# Patient Record
Sex: FEMALE | Race: WHITE | NOT HISPANIC OR LATINO | Employment: OTHER | URBAN - METROPOLITAN AREA
[De-identification: names, ages, dates, MRNs, and addresses within clinical notes are randomized per-mention and may not be internally consistent; named-entity substitution may affect disease eponyms.]

---

## 2024-02-24 PROBLEM — H35.372 EPIRETINAL MEMBRANE (ERM) OF LEFT EYE: Status: ACTIVE | Noted: 2019-04-05

## 2024-02-24 PROBLEM — H43.812 PVD (POSTERIOR VITREOUS DETACHMENT), LEFT EYE: Status: ACTIVE | Noted: 2019-06-17

## 2024-02-24 PROBLEM — H25.13 NUCLEAR SCLEROTIC CATARACT OF BOTH EYES: Status: ACTIVE | Noted: 2019-04-05

## 2024-02-24 PROBLEM — H53.10 SUBJECTIVE VISUAL DISTURBANCE: Status: ACTIVE | Noted: 2019-04-05

## 2024-02-24 PROBLEM — H43.393 VITREOUS FLOATER, BILATERAL: Status: ACTIVE | Noted: 2019-06-10

## 2024-02-24 PROBLEM — H52.7 REFRACTIVE ERROR: Status: ACTIVE | Noted: 2019-04-05

## 2024-02-26 ENCOUNTER — HOSPITAL ENCOUNTER (OUTPATIENT)
Dept: CARDIOLOGY | Facility: HOSPITAL | Age: 69
Discharge: HOME | End: 2024-02-26
Payer: MEDICARE

## 2024-02-26 ENCOUNTER — OFFICE VISIT (OUTPATIENT)
Dept: CARDIOLOGY | Facility: CLINIC | Age: 69
End: 2024-02-26
Payer: MEDICARE

## 2024-02-26 VITALS
BODY MASS INDEX: 28.16 KG/M2 | DIASTOLIC BLOOD PRESSURE: 93 MMHG | HEIGHT: 65 IN | WEIGHT: 169 LBS | SYSTOLIC BLOOD PRESSURE: 145 MMHG | HEART RATE: 96 BPM | OXYGEN SATURATION: 98 %

## 2024-02-26 DIAGNOSIS — K55.1 SUPERIOR MESENTERIC ARTERY STENOSIS (MULTI): ICD-10-CM

## 2024-02-26 DIAGNOSIS — R07.9 CHEST PAIN, UNSPECIFIED TYPE: Primary | ICD-10-CM

## 2024-02-26 PROCEDURE — 93010 ELECTROCARDIOGRAM REPORT: CPT | Performed by: INTERNAL MEDICINE

## 2024-02-26 PROCEDURE — 1159F MED LIST DOCD IN RCRD: CPT | Performed by: NURSE PRACTITIONER

## 2024-02-26 PROCEDURE — 99213 OFFICE O/P EST LOW 20 MIN: CPT | Performed by: NURSE PRACTITIONER

## 2024-02-26 PROCEDURE — 93005 ELECTROCARDIOGRAM TRACING: CPT

## 2024-02-26 PROCEDURE — 99203 OFFICE O/P NEW LOW 30 MIN: CPT | Performed by: NURSE PRACTITIONER

## 2024-02-26 RX ORDER — LEVOTHYROXINE SODIUM 25 UG/1
25 TABLET ORAL EVERY EVENING
COMMUNITY
Start: 2021-06-01

## 2024-02-26 RX ORDER — POLYETHYLENE GLYCOL 3350 17 G/17G
17 POWDER, FOR SOLUTION ORAL DAILY
COMMUNITY

## 2024-02-26 RX ORDER — LINACLOTIDE 145 UG/1
145 CAPSULE, GELATIN COATED ORAL
COMMUNITY
Start: 2019-01-28

## 2024-02-26 RX ORDER — CHOLECALCIFEROL (VITAMIN D3) 50 MCG
50 TABLET ORAL DAILY
COMMUNITY

## 2024-02-26 NOTE — PROGRESS NOTES
Bonnie Robledo is a 68 y.o. female     History Of Present Illness Mrs Ventura is a 68 year old female with a PMH of hypothyroidism and chronic headaches, here for concern of mesenteric artery stenosis noted on  an ultrasound, done for complaints of severe upper right abdominal pain since 2023.  Patient complains of constant RUQ abdominal pain.  She underwent removal of a seroma in  with no relief in pain.  In the fall of , she underwent a cholecystectomy with no relief in her pain.  The pain is constant and is not worse or better with food or water.  She has undergone multiple tests, including a colonoscopy, endoscopy, CT scans and ultrasounds.       PMHx:  Past Medical History:  Diagnosis Date  Chronic constipation  Disease of thyroid gland  hypothyroidism  Headache    PSHx:  Past Surgical History:  Procedure Laterality Date   SECTION, CLASSIC  CHOLECYSTECTOMY  COLONOSCOPY W/ BIOPSIES N/A 2023  Performed by Ben Lauren MD at [facility] ENDOSCOPY  EGD (HISTORICAL) 2013  TUBAL LIGATION  TUMOR EXCISION  WISDOM TOOTH EXTRACTION    PFMHx:  Family History  Problem Relation Name Age of Onset  Heart failure Mother 87  Heart failure Father 87  Suicide Brother 65    ALL: No Known Allergies      Review Of Systems   Constitutional: not feeling tired.   Eyes: no eyesight problems.  No vision loss or change in vision  ENT: no hearing loss and no nosebleeds.   Cardiovascular: No intermittent leg claudication,   No chest pain, no tightness or heavy pressure  No shortness of breath,  No palpitations,  No lower extremity edema  The heart rate is regular  Respiratory: no chronic cough and no shortness of breath.   Gastrointestinal: + RUG abdominal pain that is constant  no change in bowel habits and no blood in stools.   Genitourinary: no urinary frequency.   Skin: no skin rashes.   Neurological: No frequent falls.   No dizziness  No weakness  Denies headaches  Psychiatric: no depression and not  suicidal.   All other systems have been reviewed and are negative for complaint.        Allergies  No Known Allergies       Vitals  There were no vitals taken for this visit.        Physical Exam  Constitutional: alert and in no acute distress.   Eyes: no erythema, swelling or discharge from the eye .   Neck: neck is supple, symmetric, trachea midline, no masses  and no thyromegaly .   Pulmonary: No increased work of breathing or signs of respiratory distress    Lungs clear to auscultation.    Auscultation of the lungs revealed no expiratory wheezing, normal expiratory time and no inspiratory wheezing. no rales or crackles were heard bilaterally. no rhonchi  No friction rub. no wheezing.   No diminished breath sounds. no bronchial breath sounds.   Cardiovascular: carotid pulses 2+ bilaterally with no bruit    JVP was normal, no thrills ,   Regular rhythm, normal S1 and S2, no murmurs  the heart rate was normal normal S1, normal S2, no S3, no S4 no murmurs were heard.,   Pedal pulses 2+ bilaterally    No edema .   Abdomen: abdomen non-tender, no masses  and no hepatomegaly . No pulsatile mass noted  Skin: skin warm and dry, normal skin turgor .   Psychiatric judgment and insight is normal  and oriented to person, place and time .               Labs           EKG Findings  EKG:         Cardiac Service Results:  Upper gi series:  Persistent narrowing of the third portion of duodenum which can be seen with SMA syndrome. No obstruction is identified.     Remaining small bowel is normal in course and caliber. No tethering of the small bowel suggesting adhesions.         Assessment/Plan      Concern for SMA syndrome:  Unclear if her abdominal pain is related to SMA syndrome as she has constant pain that is not affected by what she eats or drinks.  The pain is located in the upper right abdominal area.  Will plan for a mesenteric duplex scan and have her follow up with me after testing to review the results.  She is to  follow up with her surgeon and GI as discussed.

## 2024-02-29 LAB
ATRIAL RATE: 76 BPM
P AXIS: 68 DEGREES
P OFFSET: 183 MS
P ONSET: 135 MS
PR INTERVAL: 166 MS
Q ONSET: 218 MS
QRS COUNT: 13 BEATS
QRS DURATION: 82 MS
QT INTERVAL: 358 MS
QTC CALCULATION(BAZETT): 402 MS
QTC FREDERICIA: 387 MS
R AXIS: 52 DEGREES
T AXIS: 53 DEGREES
T OFFSET: 397 MS
VENTRICULAR RATE: 76 BPM

## 2024-02-29 PROCEDURE — 93005 ELECTROCARDIOGRAM TRACING: CPT

## 2024-03-07 ENCOUNTER — OFFICE VISIT (OUTPATIENT)
Dept: CARDIOLOGY | Facility: CLINIC | Age: 69
End: 2024-03-07
Payer: MEDICARE

## 2024-03-07 ENCOUNTER — HOSPITAL ENCOUNTER (OUTPATIENT)
Dept: VASCULAR MEDICINE | Facility: CLINIC | Age: 69
Discharge: HOME | End: 2024-03-07
Payer: MEDICARE

## 2024-03-07 VITALS — OXYGEN SATURATION: 97 % | SYSTOLIC BLOOD PRESSURE: 149 MMHG | DIASTOLIC BLOOD PRESSURE: 84 MMHG | HEART RATE: 81 BPM

## 2024-03-07 DIAGNOSIS — K55.1 SUPERIOR MESENTERIC ARTERY STENOSIS (MULTI): ICD-10-CM

## 2024-03-07 DIAGNOSIS — K55.9 ACUTE VASCULAR DISORDER OF INTESTINE (MULTI): ICD-10-CM

## 2024-03-07 DIAGNOSIS — I77.4 MEDIAN ARCUATE LIGAMENT SYNDROME (CMS-HCC): Primary | ICD-10-CM

## 2024-03-07 PROCEDURE — 93975 VASCULAR STUDY: CPT

## 2024-03-07 PROCEDURE — 99213 OFFICE O/P EST LOW 20 MIN: CPT | Mod: 25 | Performed by: NURSE PRACTITIONER

## 2024-03-07 PROCEDURE — 99213 OFFICE O/P EST LOW 20 MIN: CPT | Performed by: NURSE PRACTITIONER

## 2024-03-07 PROCEDURE — 1159F MED LIST DOCD IN RCRD: CPT | Performed by: NURSE PRACTITIONER

## 2024-03-07 PROCEDURE — 93975 VASCULAR STUDY: CPT | Performed by: SURGERY

## 2024-03-07 NOTE — PROGRESS NOTES
Bonnie Robledo is a 68 y.o. female     History Of Present Illness    Mrs Ventura is a 68 year old female with a PMH of hypothyroidism and chronic headaches, here for a follow up for concern of mesenteric artery stenosis noted on  an ultrasound, done for complaints of severe upper right abdominal pain since 2023. The patient underwent a mesenteric duplex this am, which shows evidence of MALS.  Patient complains of constant RUQ abdominal pain.  She underwent removal of a seroma in  with no relief in pain.  In the fall of , she underwent a cholecystectomy with no relief in her pain.  The pain is constant and is not worse or better with food or water.  She has undergone multiple tests, including a colonoscopy, endoscopy, CT scans and ultrasounds.       PMHx:  Past Medical History:  Diagnosis Date  Chronic constipation  Disease of thyroid gland  hypothyroidism  Headache    PSHx:  Past Surgical History:  Procedure Laterality Date   SECTION, CLASSIC  CHOLECYSTECTOMY  COLONOSCOPY W/ BIOPSIES N/A 2023  Performed by Ben Lauren MD at [facility] ENDOSCOPY  EGD (HISTORICAL) 2013  TUBAL LIGATION  TUMOR EXCISION  WISDOM TOOTH EXTRACTION    PFMHx:  Family History  Problem Relation Name Age of Onset  Heart failure Mother 87  Heart failure Father 87  Suicide Brother 65    ALL: No Known Allergies     Social HX          Family HX  No family history on file.       Review Of Systems   Constitutional: not feeling tired.   Eyes: no eyesight problems.  No vision loss or change in vision  ENT: no hearing loss and no nosebleeds.   Cardiovascular: No intermittent leg claudication,   No chest pain, no tightness or heavy pressure  No shortness of breath,  No palpitations,  No lower extremity edema  The heart rate is regular  Respiratory: no chronic cough and no shortness of breath.   Gastrointestinal: + RUQ abdominal pain that is constant  no change in bowel habits and no blood in stools.   Genitourinary: no  urinary frequency.   Skin: no skin rashes.   Neurological: No frequent falls.   No dizziness  No weakness  Denies headaches  Psychiatric: no depression and not suicidal.   All other systems have been reviewed and are negative for complaint.           Allergies  Allergies   Allergen Reactions    Omeprazole Itching          Vitals  Visit Vitals  /84 (BP Location: Left arm, Patient Position: Sitting, BP Cuff Size: Adult)   Pulse 81 Comment: 81           Physical Exam       Cardiovascular: carotid pulses 2+ bilaterally with no bruit    JVP was normal, no thrills ,   Regular rhythm, normal S1 and S2, no murmurs  the heart rate was normal normal S1, normal S2, no S3, no S4 no murmurs were heard.,   Pedal pulses 2+ bilaterally    No edema .   Abdomen: abdomen non-tender, no masses  and no hepatomegaly . No pulsatile mass noted  Skin: skin warm and dry, normal skin turgor .   Current/Home Meds    Current Outpatient Medications:     cholecalciferol (Vitamin D3) 50 MCG (2000 UT) tablet, Take 1 tablet (50 mcg) by mouth once daily., Disp: , Rfl:     cyanocobalamin, vitamin B-12, (VITAMIN B-12 ORAL), Take by mouth., Disp: , Rfl:     Linzess 145 mcg capsule, 1 capsule (145 mcg)., Disp: , Rfl:     polyethylene glycol (Glycolax, Miralax) 17 gram packet, Take 17 g by mouth once daily., Disp: , Rfl:     Synthroid 25 mcg tablet, 1 tablet (25 mcg)., Disp: , Rfl:          Cardiac Service Results:    Mesenteric: SMA demonstrates no evidence of hemodynamically significant stenosis and 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 Velocities were decreased with inspiration and/or change to a sitting position which maybe suggesive of median arcuate ligament compression as opposed to intrinsic stenosis. The patient was NPO for this study. Turbulent flow noted in the distal celiac artery, hepatic, and splenic arteries.     Imaging & Doppler Findings:     Aorta PSV         100 cm/s  Celiac Origin   cm/s  Celiac Prox PSV   543 cm/s  Celiac Mid PSV    162 cm/s  Celiac Dist PSV   189 cm/s  SMA Origin PSV    174 cm/s  SMA Prox PSV      126 cm/s  SMA Mid PSV       104 cm/s  SMA Dist PSV      84 cm/s  SUNDAY PSV           110 cm/s  SUNDAY Prox PSV      110 cm/s  Hepatic PSV       250 cm/s  Splenic PSV       121 cm/s       Assessment/Plan      Median arcuate ligament syndrome (MALS) :  Mesenteric duplex scan done this am was positive for median arcuate ligament compression.  Case discussed with Dr Gary.  Will place a consult to Dr Farley (vascular surgery) for further evaluation and treatment.  She can follow up with me at the direction of Dr Farley.

## 2024-04-16 ENCOUNTER — OFFICE VISIT (OUTPATIENT)
Dept: VASCULAR SURGERY | Facility: CLINIC | Age: 69
End: 2024-04-16
Payer: MEDICARE

## 2024-04-16 VITALS
HEIGHT: 65 IN | RESPIRATION RATE: 18 BRPM | DIASTOLIC BLOOD PRESSURE: 94 MMHG | WEIGHT: 170 LBS | BODY MASS INDEX: 28.32 KG/M2 | SYSTOLIC BLOOD PRESSURE: 156 MMHG

## 2024-04-16 DIAGNOSIS — Z86.018 HISTORY OF BENIGN SCHWANNOMA: Primary | ICD-10-CM

## 2024-04-16 DIAGNOSIS — I77.4 MEDIAN ARCUATE LIGAMENT SYNDROME (CMS-HCC): ICD-10-CM

## 2024-04-16 PROCEDURE — 1036F TOBACCO NON-USER: CPT | Performed by: SURGERY

## 2024-04-16 PROCEDURE — 99215 OFFICE O/P EST HI 40 MIN: CPT | Performed by: SURGERY

## 2024-04-16 PROCEDURE — 1125F AMNT PAIN NOTED PAIN PRSNT: CPT | Performed by: SURGERY

## 2024-04-16 PROCEDURE — 1159F MED LIST DOCD IN RCRD: CPT | Performed by: SURGERY

## 2024-04-16 PROCEDURE — 99205 OFFICE O/P NEW HI 60 MIN: CPT | Performed by: SURGERY

## 2024-04-16 ASSESSMENT — ENCOUNTER SYMPTOMS
LOSS OF SENSATION IN FEET: 0
DEPRESSION: 0
OCCASIONAL FEELINGS OF UNSTEADINESS: 0

## 2024-04-16 ASSESSMENT — PATIENT HEALTH QUESTIONNAIRE - PHQ9
2. FEELING DOWN, DEPRESSED OR HOPELESS: NOT AT ALL
1. LITTLE INTEREST OR PLEASURE IN DOING THINGS: NOT AT ALL
SUM OF ALL RESPONSES TO PHQ9 QUESTIONS 1 AND 2: 0

## 2024-04-16 ASSESSMENT — LIFESTYLE VARIABLES
HOW MANY STANDARD DRINKS CONTAINING ALCOHOL DO YOU HAVE ON A TYPICAL DAY: 1 OR 2
SKIP TO QUESTIONS 9-10: 1
HOW OFTEN DO YOU HAVE A DRINK CONTAINING ALCOHOL: 2-3 TIMES A WEEK
AUDIT-C TOTAL SCORE: 3
HOW OFTEN DO YOU HAVE SIX OR MORE DRINKS ON ONE OCCASION: NEVER

## 2024-04-16 ASSESSMENT — VISUAL ACUITY: OU: 1

## 2024-04-16 ASSESSMENT — PAIN SCALES - GENERAL: PAINLEVEL: 3

## 2024-04-16 NOTE — PROGRESS NOTES
Vascular Surgery Consultation, History, Physical    Bonnie Robledo is a 68 y.o. year old female patient.   History:   Pain began about a year ago in right upperQuadrant near scar incision from prior retroperitoneal schwannoma resection.  Pain is often postprandial and associated with nausea and vomiting and patient suffered a 15 pound weight loss over the past year.  She has had an extensive GI workup including upper GI series and upper and lower endoscopies which have all been negative except for the upper GI series has been reported positive for SMA syndrome according to patient but general surgical consultation stated she did not have SMA syndrome.    She eats small meals.  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  Afterwards she developed same pain and she had her gallbladder removed without improvement.  She has always tolerated this discomfort until last year when she woke up with an episode of severe stabbing unrelenting pain.    Mesenteric duplex shows velocities of 596 cm/s the celiac axis suggesting severe compression by the median arcuate ligament.  She only has noncontrast CT from 3/16/2023.      Symptoms History  Pain Onset: 3/16/2023  Pain Location: RUQ  Pain Triggers: Eating, spontaneous  Digestive symptoms: nausea, early satiety, vomiting  Genito-Urologic symptoms: none  Weight Change (over time period): 15 pounds over year    Imaging Studies  Mesenteric Duplex: Severe compression celiac axis by median arcuate ligament with velocities over 500cm/s resolving with deep inspiration  CT/A: ordered  Contrast Angiography: none  UGIS: reportedly positive for SMAS  [facility]: [facility]  [facility]: negative -[facility] report requested    Surgical History  Cholecystectomy: 2013 with similar pain profile after resection of retroperitoneal Schwannoma  Feeding tube: none  Central lines:  none  Other Surgery: 2013 -resection of retroperitoneal Schwannoma by Monica at [facility]        History reviewed. No pertinent past medical history.    History reviewed. No pertinent surgical history.    Active Ambulatory Problems     Diagnosis Date Noted    Epiretinal membrane (ERM) of left eye 04/05/2019    Nuclear sclerotic cataract of both eyes 04/05/2019    PVD (posterior vitreous detachment), left eye 06/17/2019    Refractive error 04/05/2019    Subjective visual disturbance 04/05/2019    Vitreous floater, bilateral 06/10/2019     Resolved Ambulatory Problems     Diagnosis Date Noted    No Resolved Ambulatory Problems     No Additional Past Medical History       Review of Systems   Constitutional: Negative.    HENT: Negative.     Eyes: Negative.    Respiratory: Negative.     Cardiovascular: Negative.    Gastrointestinal: Negative.    Endocrine: Negative.    Genitourinary: Negative.    Musculoskeletal: Negative.    Skin: Negative.    Allergic/Immunologic: Negative.    Neurological: Negative.    Hematological: Negative.    Psychiatric/Behavioral: Negative.       Allergies   Allergen Reactions    Omeprazole Itching       Vitals:   Visit Vitals  BP (!) 156/94 Comment: patient states that she is nervous, advised to follow up PCP   Resp 18     Physical Exam:   Vascular Physical Exam  Constitutional:       General: She is awake.   HENT:      Head: Normocephalic.   Eyes:      General: Vision grossly intact.      Pupils: Pupils are equal, round, and reactive to light.   Pulmonary:      Effort: Pulmonary effort is normal.   Abdominal:      General: Abdomen is protuberant. Surgical scar is present.      Tenderness: There is abdominal tenderness.      Comments: Pain medial to cephalad margin of scar   Musculoskeletal:         General: Normal range of motion.   Skin:     General: Skin is warm.   Neurological:      General: No focal deficit present.      Mental Status: She is alert.   Psychiatric:         Mood and Affect:  "Mood normal.         Labs:  LABS:  CBC with Differential:  No results found for: \"WBC\", \"RBC\", \"HGB\", \"HCT\", \"PLT\", \"MCV\", \"MCH\", \"MCHC\", \"RDW\", \"NRBC\", \"SEGSPCT\", \"BANDSPCT\", \"BLASTSPCT\", \"METASPCT\", \"LYMPHOPCT\", \"PROMYELOPCT\", \"MONOPCT\", \"MYELOPCT\", \"EOSPCT\", \"BASOPCT\", \"MONOSABS\", \"LYMPHSABS\", \"EOSABS\", \"BASOSABS\", \"DIFFTYPE\"  CMP:  No results found for: \"NA\", \"K\", \"CL\", \"CO2\", \"BUN\", \"CREATININE\", \"AGRATIO\", \"GLUCOSE\", \"GLU\", \"PROT\", \"CALCIUM\", \"BILITOT\", \"ALKPHOS\", \"AST\", \"ALT\"  BMP:  No results found for: \"NA\", \"K\", \"CL\", \"CO2\", \"BUN\", \"CREATININE\", \"CALCIUM\", \"GLUCOSE\", \"GLU\"  Magnesium:No results found for: \"MG\"  Troponin:  No results found for: \"TROPHS\"  BNP: No results found for: \"BNP\"    No results found for: \"PR1\", \"BMPR1A\", \"INR\", \"PROTIME\", \"PTT\", \"CHOL\", \"LDLF\", \"HDL\"    Imaging: CTA ordered      Assessment/Plan   Diagnoses and all orders for this visit:  History of benign schwannoma  -     CT angio abdomen pelvis w and or wo IV IV contrast; Future  -     Creatinine, Serum; Future  -     Referral to Pain Medicine; Future  -     Referral to General Surgery; Future  Median arcuate ligament syndrome (CMS-HCC)  -     Referral to Vascular Surgery  -     CT angio abdomen pelvis w and or wo IV IV contrast; Future  -     Creatinine, Serum; Future  -     Referral to Pain Medicine; Future  -     Referral to General Surgery; Future    Will get CTA -check for schwannoma and arterial anatomy, pain medicine for celiac plexus block, gen surg for GI workup.    "

## 2024-04-18 ENCOUNTER — TELEPHONE (OUTPATIENT)
Dept: SURGERY | Facility: CLINIC | Age: 69
End: 2024-04-18
Payer: MEDICARE

## 2024-04-18 NOTE — TELEPHONE ENCOUNTER
Thank you for speaking with me earlier today & confirming your scheduled visit with Dr. Kumar on 5/13/24 12PM at [facility] [id number] [place] [address] 440[address][id number] (inside [facility], main floor in the Specialty Clinic).   Parking is available at a cost of $5.00 at the Main Entrance.   We look forward to seeing you, Nancy Alvarez LPN Clinic Nurse.

## 2024-04-24 ENCOUNTER — APPOINTMENT (OUTPATIENT)
Dept: SURGERY | Facility: CLINIC | Age: 69
End: 2024-04-24
Payer: MEDICARE

## 2024-05-06 ENCOUNTER — HOSPITAL ENCOUNTER (OUTPATIENT)
Dept: RADIOLOGY | Facility: CLINIC | Age: 69
Discharge: HOME | End: 2024-05-06
Payer: MEDICARE

## 2024-05-06 DIAGNOSIS — Z86.018 HISTORY OF BENIGN SCHWANNOMA: ICD-10-CM

## 2024-05-06 DIAGNOSIS — I77.4 MEDIAN ARCUATE LIGAMENT SYNDROME (CMS-HCC): ICD-10-CM

## 2024-05-06 PROCEDURE — 2550000001 HC RX 255 CONTRASTS: Performed by: SURGERY

## 2024-05-06 PROCEDURE — 74174 CTA ABD&PLVS W/CONTRAST: CPT | Performed by: RADIOLOGY

## 2024-05-06 PROCEDURE — 74174 CTA ABD&PLVS W/CONTRAST: CPT

## 2024-05-06 RX ADMIN — IOHEXOL 75 ML: 350 INJECTION, SOLUTION INTRAVENOUS at 11:15

## 2024-05-07 ENCOUNTER — TELEPHONE (OUTPATIENT)
Dept: SURGERY | Facility: CLINIC | Age: 69
End: 2024-05-07
Payer: MEDICARE

## 2024-05-07 NOTE — TELEPHONE ENCOUNTER
Left message reminding patient of upcoming appointment date/time and nurse contact number given for any further questions/concerns

## 2024-05-07 NOTE — PROGRESS NOTES
GENERAL SURGERY REFLUX SURGERY NEW PATIENT CONSULTATION  HISTORY AND PHYSICAL    CLINIC DATE:  5/13/24    NAME: Bonnie Robledo  68 y.o.  MRN: [id number]    WEIGHT / BMI TODAY:  171 LBS/ 28.46  Wt Readings from Last 1 Encounters:   05/13/24 77.6 kg (171 lb)      BMI Readings from Last 1 Encounters:   05/13/24 28.46 kg/m²       SURGEON:  Dr. Nicolle Kumar    PRESENTING TODAY for General Surgery initial consult visit:  Is a 68 y.o. female reporting Initial onset of symptoms was:  Since 3/2023.  Self verbalizes concerns:  MALS Referral from Dr. SAE Farley (last note attached below)    Recently completed CT ABD/Pelvis & Mesenteric Artery Duplex test results attached below.  2013: removal of RP schwannoma. She was told it was by kidney retroperitoneal. Not related to adrenal. Not sure of size. No recurrence seen on CT. Pain in front now not back. Always in same spot on abd wall. It was retroperitoneal. She has a large Kocher incision.  Her presenting symptoms at that time was abdominal pain and vomiting. She still had discomfort since that time in the right hemiabdomen. Pain was better. For that, she had a cholecystectomy in 2013. She continued to have the discomfort every month or so. Last year, in March, she woke up in the middle of the night, she woke up with severe abdominal pain in the same spot - right hemiabdomen. It lasted for hours then the pain subsided that day. She called her doctor who ordered a scan.  She denies bloating.  She notes only vomits if has pain already and eats certain things.  Only vomits maybe once/week but no obvious trigger.  Moves bowels daily on linzess and miralax.  She notes the pain is constant on her side.  The incision is more vertial than a kocher.  On ct clips seen next to IVC from prior resection.  No back pain.     Since then has pain in same spot.  The symtpms worsened in March last year that awaked her at night.  Lasted many hours and then doctor started more work up.  All done in  [place] at [facility].    Ultrasound normal  Colonoscopy normal at [facility] normal  Upper endoscopy normal  CT angio show mals.  Symptoms improve with daily linzess and miralax..  Was constipated and never worked up and moves bowels every 3-4 days.  Pain depends on food.  Worse if bends over.  Soft food easier.  Vomits a couple otimes/week.  No GES done  She saw GI and surgeons - eventually diagnosed her with SMA syndrome. The surgeon in [place] told her she didn't have SMA syndrome. PCP tried to refer her to ml but they didn't want to see her. She finally was referred to Dr. Bernard in [place] and her PA ordered an US that showed MALS who then referred her to Dr. Farley.   The pain is always there but depends on if she is taking linzess and miralax as well as what she eats. Because of the linzess and miralax, she has bowel movements daily. She used to have constipation since years that has not been worked up by GI. She would not have a BM for 3-4 days. She is always drinking. She does eat fruits/vegetables - maybe 3-4 servings a day.  The pain is worse when she bends.   She did lose weight - unintentional - 195 to 175lbs.   Sometimes after eating, she does have severe pain and she vomits. It happens a few times a week. She wouldn't vomit if she didn't eat anything.   No heartburn or reflux. No regurge. No chest tightness or pain. She did have EGD and colonoscopy last year at [place] in [facility] - negative.     CURRENT SYMPTOMS:-    Abdominal pain:  Yes          Abdominal bloating:  No         Burping:  No          Chest Pain:  No      Chronic Cough: No         Constipation: Yes    Diarrhea: No    Dysphagia:  No       Experiencing hunger: No    Food Intolerances: No    Food Sticking:  No    Gassy:  No    Hiccups: No          Hoarseness:  No       Hx Gastric Ulcers:  No         Nausea/vomiting: Yes    Symptoms Affect Ability to Excercise:  Yes         Throat Clearing:  No             DIET HISTORY:      Carbonated Beverages: Yes           Caffeinated Beverages: Yes    Fluid intake: feels stays hydrated oz/day        GERD - Health Related Quality of Life Questionnaire (GERD- HRQL)  Off PPI for allergy to Omeprazole (very itching)  last August (how long)    How bad is the heartburn? 0 = No symptoms  Heartburn when lying down? 0 = No symptoms  Heartburn when standing up? 0 = No symptoms  Heartburn after meals? 0 = No symptoms  Does heartburn change your diet? 0 = No symptoms  Does heartburn wake you from sleep? 0 = No symptoms    Do you have difficulty swallowing? 0 = No symptoms  Do you have pain with swallowing? 0 = No symptoms  If you take medication, does this affect your daily life? 0 = No symptoms    How bad is the regurgitation? 0 = No symptoms  Regurgitation when lying down? 0 = No symptoms  Regurgitation when standing up? 0 = No symptoms  Regurgitation after meals? 0 = No symptoms  Does regurgitation change your diet? 0 = No symptoms  Does regurgitation wake you from sleep? 0 = No symptoms  How satisfied are you with your present condition? Satisfied    Total score (calculated by summing the individual scores of questions 1-15): 0   Greatest possible score 75 (worst symptoms).   Lowest possible score 0 (no symptoms).    Heartburn score (calculated by summing the individual scores of questions 1-6): 0   Worst heartburn symptoms: 30.   No heartburn symptoms: 0.   Score less than or equal to 12 with each individual question not exceeding 2 indicate heartburn elimination.    Regurgitation score (calculated by summing the individual scores of questions 10-15): 0   Worst regurgitation symptoms: 30.   No regurgitation symptoms: 0.   Score less than or equal to 12 with each individual question not exceeding 2 indicate regurgitation elimination.     DR FARLEY 4/16/24 NOTE:    Office Visit  4/16/2024  [facility] Physician Ambrose Farley MD  Vascular Surgery History of benign schwannoma +1 more  Dx New Patient Visit ; Referred by Courtney  CEZAR Dewitt-CNP  Reason for Visit     Progress Notes  Cl Farley MD (Physician)  Vascular Surgery  Expand All Collapse All  Vascular Surgery Consultation, History, Physical     Bonnie Robledo is a 68 y.o. year old female patient.   History:   Pain began about a year ago in right upperQuadrant near scar incision from prior retroperitoneal schwannoma resection.  Pain is often postprandial and associated with nausea and vomiting and patient suffered a 15 pound weight loss over the past year.  She has had an extensive GI workup including upper GI series and upper and lower endoscopies which have all been negative except for the upper GI series has been reported positive for SMA syndrome according to patient but general surgical consultation stated she did not have SMA syndrome.     She eats small meals.  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  Afterwards she developed same pain and she had her gallbladder removed without improvement.  She has always tolerated this discomfort until last year when she woke up with an episode of severe stabbing unrelenting pain.     Mesenteric duplex shows velocities of 596 cm/s the celiac axis suggesting severe compression by the median arcuate ligament.  She only has noncontrast CT from 3/16/2023.        Symptoms History  Pain Onset: 3/16/2023  Pain Location: RUQ  Pain Triggers: Eating, spontaneous  Digestive symptoms: nausea, early satiety, vomiting  Genito-Urologic symptoms: none  Weight Change (over time period): 15 pounds over year     Imaging Studies  Mesenteric Duplex: Severe compression celiac axis by median arcuate ligament with velocities over 500cm/s resolving with deep inspiration  CT/A: ordered  Contrast Angiography: none  UGIS: reportedly positive for SMAS  EGD: negative -done at [facility] report requested  Colonoscopy: negative -done at [facility] report requested     Surgical  History  Cholecystectomy: 2013 with similar pain profile after resection of retroperitoneal Schwannoma  Feeding tube: none  Central lines: none  Other Surgery: 2013 -resection of retroperitoneal Schwannoma by [facility] at [facility]           Medical History   History reviewed. No pertinent past medical history.        Surgical History   History reviewed. No pertinent surgical history.             Active Ambulatory Problems     Diagnosis Date Noted    Epiretinal membrane (ERM) of left eye 04/05/2019    Nuclear sclerotic cataract of both eyes 04/05/2019    PVD (posterior vitreous detachment), left eye 06/17/2019    Refractive error 04/05/2019    Subjective visual disturbance 04/05/2019    Vitreous floater, bilateral 06/10/2019           Resolved Ambulatory Problems     Diagnosis Date Noted    No Resolved Ambulatory Problems      No Additional Past Medical History         Review of Systems   Constitutional: Negative.    HENT: Negative.     Eyes: Negative.    Respiratory: Negative.     Cardiovascular: Negative.    Gastrointestinal: Negative.    Endocrine: Negative.    Genitourinary: Negative.    Musculoskeletal: Negative.    Skin: Negative.    Allergic/Immunologic: Negative.    Neurological: Negative.    Hematological: Negative.    Psychiatric/Behavioral: Negative.             Allergies   Allergen Reactions    Omeprazole Itching         Vitals:   Visit Vitals  BP (!) 156/94 Comment: patient states that she is nervous, advised to follow up PCP   Resp 18      Physical Exam:   Vascular Physical Exam  Constitutional:       General: She is awake.   HENT:      Head: Normocephalic.   Eyes:      General: Vision grossly intact.      Pupils: Pupils are equal, round, and reactive to light.   Pulmonary:      Effort: Pulmonary effort is normal.   Abdominal:      General: Abdomen is protuberant. Surgical scar is present.      Tenderness: There is abdominal tenderness.      Comments: Pain medial to cephalad margin of scar  "  Musculoskeletal:         General: Normal range of motion.   Skin:     General: Skin is warm.   Neurological:      General: No focal deficit present.      Mental Status: She is alert.   Psychiatric:         Mood and Affect: Mood normal.            Labs:  LABS:  CBC with Differential:  No results found for: \"WBC\", \"RBC\", \"HGB\", \"HCT\", \"PLT\", \"MCV\", \"MCH\", \"MCHC\", \"RDW\", \"NRBC\", \"SEGSPCT\", \"BANDSPCT\", \"BLASTSPCT\", \"METASPCT\", \"LYMPHOPCT\", \"PROMYELOPCT\", \"MONOPCT\", \"MYELOPCT\", \"EOSPCT\", \"BASOPCT\", \"MONOSABS\", \"LYMPHSABS\", \"EOSABS\", \"BASOSABS\", \"DIFFTYPE\"  CMP:  No results found for: \"NA\", \"K\", \"CL\", \"CO2\", \"BUN\", \"CREATININE\", \"AGRATIO\", \"GLUCOSE\", \"GLU\", \"PROT\", \"CALCIUM\", \"BILITOT\", \"ALKPHOS\", \"AST\", \"ALT\"  BMP:  No results found for: \"NA\", \"K\", \"CL\", \"CO2\", \"BUN\", \"CREATININE\", \"CALCIUM\", \"GLUCOSE\", \"GLU\"  Magnesium:No results found for: \"MG\"  Troponin:  No results found for: \"TROPHS\"  BNP: No results found for: \"BNP\"     No results found for: \"PR1\", \"BMPR1A\", \"INR\", \"PROTIME\", \"PTT\", \"CHOL\", \"LDLF\", \"HDL\"     Imaging: CTA ordered        Assessment/Plan   Diagnoses and all orders for this visit:  History of benign schwannoma  -     CT angio abdomen pelvis w and or wo IV IV contrast; Future  -     Creatinine, Serum; Future  -     Referral to Pain Medicine; Future  -     Referral to General Surgery; Future  Median arcuate ligament syndrome (CMS-HCC)  -     Referral to Vascular Surgery  -     CT angio abdomen pelvis w and or wo IV IV contrast; Future  -     Creatinine, Serum; Future  -     Referral to Pain Medicine; Future  -     Referral to General Surgery; Future     Will get CTA -check for schwannoma and arterial anatomy, pain medicine for celiac plexus block, gen surg for GI workup.         Electronically signed by Cl Farley MD at 4/16/2024  2:46 PM     Instructions    AVS - Outpatient (Printed 4/16/2024)  Additional Documentation    Vitals: /94 Important       Resp 18     Ht 1.651 m (5' 5\")     Wt 77.1 " kg (170 lb)     BMI 28.29 kg/m²     BSA 1.88 m²     Pain Sc   3 (Loc: Abdomen) (Edu: Yes)          More Vitals   Flowsheets: Interfaced Flowsheet Data,     Opioid Risk Tool (FEMALE),     Audit Alcohol Screening,     Alcohol Use Disorders Identification Test,     Patient Health Questionnaire-2/9,     Spiritual and Cultural Beliefs,     Domestic Violence Screen (Annually/Change in Status) Outpatient Hospital ([place]) Required,     Key Learner   SmartForms: BHAVANA RODRIGUEZ FALL RISK   Encounter Info: Billing Info,     History,     Allergies,     Developmental     Communications    View All Conversations on this Encounter    Chart Routed to Nicolle Kumar MD MPH and Agapito Parker MD  Travel Screening    Question 4/16/2024  1:14 PM EDT - Filed by Patient   Do you have any of the following new or worsening symptoms? None of these   Have you recently been in contact with someone who was sick? No / Unsure       COMPLETED TEST RESULTS:    CT angio abdomen pelvis w and or wo IV IV contrast  Status: Final result     PACS Images     Show images for CT angio abdomen pelvis w and or wo IV IV contrast  Signed by    Signed Time Phone Pager   Juan Winters MD 5/09/2024 06:02 [phone]     Exam Information    Status Exam Begun Exam Ended   Final 5/06/2024 10:58 5/06/2024 13:50     Study Result    Narrative & Impression   Interpreted By:  Juan Winters,  and Master [address]   STUDY:  CT ANGIO ABDOMEN PELVIS W AND/OR WO IV IV CONTRAST;  5/6/2024 1:50 pm      INDICATION:  Signs/Symptoms:right upper quadrant pain, celiac axis compression,  prior schwannoma resection. 68-year-old female with a prior surgical  resection of the schwannoma. 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 CTA was performed for further  evaluation      COMPARISON:  Vascular mesenteric duplex: 03/07/2024.      ACCESSION NUMBER(S):  [id number]      ORDERING  CLINICIAN:  ILANA DELANEY      TECHNIQUE:  Axial non-contrast images of the abdomen, and pelvis with coronal and  sagittal reformatted images. Axial CT images of the abdomen and  pelvis after the intravenous administration of 75 mL of Omnipaque 350  using angiographic technique with coronal and sagittal reformatted  images. MIP images were provided and reviewed. 3D reconstructions  were created on a separate independent workstation and reviewed.      FINDINGS:  VASCULATURE:      ABDOMINAL AORTA: No abdominal aortic aneurysm or dissection. Mild  abdominal aortic atherosclerosis.      ABDOMINAL AND PELVIC ARTERIES:      Celiac artery is patent at the origin. Proximal celiac artery just  distal to the origin of the straits a significant focal area of  stenosis better visualized on sagittal imaging series 503, image 105.  Distal to the focal area of stenosis there is mild post stenotic  dilatation with otherwise normal opacification of the main branching  vessels of the celiac trunk. Superior mesenteric artery is widely  patent without hemodynamically significant stenosis.      Right and left renal arteries are widely patent without  hemodynamically significant stenosis.      Inferior mesenteric artery is widely patent without hemodynamically  significant stenosis.      Bilateral common iliac arteries, external iliac and internal iliac  arteries are widely patent without hemodynamically significant  stenosis.      Bilateral common femoral arteries and partially visualized proximal  SFA and profunda arteries are widely patent without hemodynamically  significant stenosis.      NONVASCULAR STRUCTURES:      LOWER CHEST:  The visualized lung base is unremarkable. The heart is normal in size  without pericardial effusion. No pleural effusion is present.  Visualized distal esophagus appears normal.      ABDOMEN:      LIVER:  The liver is normal in size without evidence of focal liver lesions.      BILE DUCTS:  The intrahepatic  and extrahepatic ducts are not dilated.      GALLBLADDER:  The gallbladder is decompressed.      PANCREAS:  The pancreas appears unremarkable without evidence of ductal  dilatation or masses.      SPLEEN:  The spleen is normal in size.      ADRENAL GLANDS:  Bilateral adrenal glands appear normal.      KIDNEYS AND URETERS:  The kidneys are normal in size and enhance symmetrically. Trace  fullness of the bilateral pelvicalyceal systems without evidence of  hydroureteronephrosis.      PELVIS:      BLADDER:  Within normal limits.      REPRODUCTIVE ORGANS:  No pelvic masses.      BOWEL:  The stomach is unremarkable. The small and large bowel are normal in  caliber and demonstrate no wall thickening. Normal appendix.          PERITONEUM/RETROPERITONEUM/LYMPH NODES:  Surgical clips are seen posterior to the supra and infrarenal IVC  along the right anterior vertebral bodies likely representing area of  prior surgical resection. No soft tissue lesions within the area to  suggest recurrence. Is no free or loculated fluid collection, no free  intraperitoneal air. The retroperitoneum appears normal. No enlarged  mesenteric lymph nodes.      BONES AND ABDOMINAL WALL:  No suspicious osseous lesions are identified. Mild degenerative  discogenic disease is noted in the lower thoracic and lumbar spine.  The abdominal wall soft tissues appear normal.      IMPRESSION:  1. Significant focal narrowing of the proximal celiac trunk just  distal to the origin of the suspected level of the median arcuate  ligament. Distal to the focal area of stenosis there is mild  poststenotic dilatation with otherwise normal opacification of the  vasculature. In the appropriate clinical setting findings can be seen  with median arcuate ligament syndrome.  2. The remaining vasculature demonstrates no significant abnormality.  3. No acute nonvascular abnormalities of the abdomen and pelvis.  Chronic findings are as detailed above.          I personally  "reviewed the images/study and I agree with the findings  as stated by Resident Maciel Thao MD. This study was interpreted  at [facility], [place], [place].      MACRO:  None.      Signed by: Juan Winters 5/9/2024 6:02 AM  Dictation workstation:   [id number]     Result History    CT angio abdomen pelvis w and or wo IV IV contrast (Order #[id number]) on 5/9/2024 - Order Result History Report    Breast Imaging Recommendations  Bonnie Robledo  No recommendations exist for this order.  External Results Report    Open External Results Report    Encounter    View Encounter             IR Documentation Timeline (5/9/2024 13:35:58 to 5/9/2024 13:35:58)    5/6/2024 5/6/2024 Event Details User   10:54:53 In Facility Status: Arrived --           Screening Form Questions    No questions have been answered for this form.           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  [address], [address], [place] [address]                   Tel [phone]        Vascular Lab Report  VASC US MESENTERIC ARTERY DUPLEX COMPLETE        Patient Name:     BONNIE ROBLEDO       Reading Physician: [id number] Cecilio Reina DO  Study Date:  "      3/7/2024            Ordering           [id number] ARMEN DEWITT                                        Physician:  MRN/PID:          [id number]            Technologist:      Enid Andrade RVT,                                                           [facility]  Accession#:       [id number]        Technologist 2:  Date of           1955 / 68      Encounter#:        [id number]  Birth/Age:        years  Gender:           F  Admission Status: Outpatient          Location           [facility]                                        Performed:        Diagnosis/ICD: Acute mesenteric ischemia-K55.0  CPT Codes:     20527 Mesenteric Duplex scan        **CRITICAL RESULT**  Critical Result: Positive for median arcuate ligament compression.  Notification called to Dr Gary/Sheryl Dewitt CNP on 3/7/2024 at 8:54:28 AM by Enid Andrade RVT.     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 Velocities were decreased with inspiration and/or change to a sitting position which maybe suggesive of median arcuate ligament compression as opposed to intrinsic stenosis. The patient was NPO for this study. Turbulent flow noted in the distal celiac artery, hepatic, and splenic arteries.     Imaging & Doppler Findings:     Aorta PSV         100 cm/s  Celiac Origin  cm/s  Celiac Prox PSV   543 cm/s  Celiac Mid PSV    162 cm/s  Celiac Dist PSV   189 cm/s  SMA Origin PSV    174 cm/s  SMA Prox PSV      126 cm/s  SMA Mid PSV       104 cm/s  SMA Dist PSV      84 cm/s  SUNDAY PSV           110 cm/s  SUNDAY Prox PSV      110 cm/s  Hepatic PSV       250 cm/s  Splenic PSV       121 cm/s           [id number] Cecilio Reina DO  Electronically signed by [id number] Cceilio Reina DO on 3/7/2024 at 2:39:23 PM           ** Final **     Exam Details    Performed Procedure Technologist Supporting Staff Performing Physician    Vascular US Mesenteric Artery Duplex Complete Enid Andrade, RT           Appointment Date/Status Modality Department    3/7/2024     Completed PAR FAIRLW VASCULAR PAR [id number] VASCULAR           Begin Exam End Exam  End Exam Questionnaires   3/7/2024  8:00 AM 3/7/2024  9:00 AM  OTIS RIS PACS IMAGES REVIEW QUESTIONNAIRE            Scans on Order [id number]    [facility] - Scan on 3/7/2024  9:04 AM        [facility] - Scan on 3/7/2024  2:41 PM        All Reviewers List    Cl Farley MD on 4/22/2024 15:36     Signed    Electronically signed by Cecilio Reina DO on 3/7/24 at 1439 EST       Mesenteric artery duplex complete: Patient Communication     Add Comments   Add Notifications      Encounter    View Encounter            External Results Report    Open External Results Report     Order Report     Order Details          Encounter-Level Documents on 03/07/2024:    Consent-General - Electronic signature on 3/7/2024 8:03 AM - E-signed         Order-Level Documents on 03/07/2024:    [facility] - Scan on 3/7/2024  2:41 PM  [facility] - Scan on 3/7/2024  9:04 AM         Patient-Level Documents:    Referral Attachment - Document on 4/19/2024 11:05 AM: [email or web address]  Patient Records - Scan on 4/17/2024 10:37 AM: [facility]-Upper Gi Endoscopy report 4/20/2024  Parent Guardian Consent to Treat Minor Patients - Scan on 4/17/2024 10:35 AM: EGD-Colonoscopy report 2/12/2013  Patient Records - Scan on 4/17/2024 10:28 AM: [facility] - Final G.I. Pathology report  Patient Records - Scan on 4/17/2024 10:24 AM: [facility]-Colonoscopy results  Consent-General Physician - Electronic signature on 4/16/2024 1:10 PM - E-signed  HIPAA for E-Sig - Electronic signature on 4/16/2024 1:09 PM - E-signed  Referral Attachment - Document on 2/27/2024 1:19 PM: [email or web address]  Insurance Card - Scan on 2/26/2024 3:01 PM: Insurance Card  Photo ID - Scan on 2/26/2024 3:01 PM                 CURRENT MEDICATIONS:     Current Outpatient Medications   Medication Sig Dispense Refill    cholecalciferol (Vitamin D3) 50 MCG (2000 UT) tablet Take 1 tablet (50 mcg) by mouth once daily.      cyanocobalamin, vitamin B-12, (VITAMIN B-12 ORAL) Take by mouth.      Linzess 145 mcg capsule 1 capsule (145 mcg).      losartan (Cozaar) 25 mg tablet Take 1 tablet (25 mg) by mouth once daily.      polyethylene glycol (Glycolax, Miralax) 17 gram packet Take 17 g by mouth once daily.      Synthroid 25 mcg tablet 1 tablet (25 mcg).       No current facility-administered medications for this visit.       PAST MEDICAL HISTORY:  Patient Active Problem List   Diagnosis    Epiretinal membrane (ERM) of left eye    Nuclear sclerotic cataract of both eyes    PVD (posterior vitreous detachment), left eye    Refractive error    Subjective visual disturbance    Vitreous floater, bilateral       PAST SURGICAL HISTORY:  Past Surgical History:   Procedure Laterality Date     SECTION, CLASSIC      CHOLECYSTECTOMY         FAMILY HISTORY:  Family History   Problem Relation Name Age of Onset    Hypertension Mother      Heart attack Mother         SOCIAL HISTORY:  Social History     Socioeconomic History    Marital status: Unknown     Spouse name: Not on file    Number of children: Not on file    Years of education: Not on file    Highest education level: Not on file   Occupational History    Not on file   Tobacco Use    Smoking status: Never    Smokeless tobacco: Never   Substance and Sexual Activity    Alcohol use: Yes     Alcohol/week: 1.0 standard drink of alcohol     Types: 1 Shots of liquor per week     Comment: 4 week    Drug use: Never    Sexual activity: Not on file   Other Topics Concern    Not on file   Social History Narrative    Not on file     Social Determinants of Health     Financial Resource Strain: Not on file   Food Insecurity: Not on file   Transportation Needs: Not on file   Physical Activity: Not on file   Stress: Not on file   Social  Connections: Not on file   Intimate Partner Violence: Not At Risk (4/20/2023)    Received from [facility]    Humiliation, Afraid, Rape, and Kick questionnaire     Fear of Current or Ex-Partner: No     Emotionally Abused: No     Physically Abused: No     Sexually Abused: No   Housing Stability: Not on file       ALLERGIES:  Allergies   Allergen Reactions    Omeprazole Itching       REVIEW OF SYSTEMS:  GENERAL: Negative for malaise, significant weight loss and fever  NECK: Negative for lumps, goiter, pain and significant neck swelling  RESPIRATORY: Negative for cough, wheezing or shortness of breath.  CARDIOVASCULAR: Negative for chest pain, leg swelling or palpitations.  GI: Negative for abdominal discomfort, blood in stools or black stools or change in bowel habits. Right upper quadrant kocher incision and laparoscopic healed incisions.   : No history of dysuria, frequency or incontinence  MUSCULOSKELETAL: Negative for joint pain or swelling, back pain or muscle pain.  SKIN: Negative for lesions, rash, and itching.  PSYCH: Negative for sleep disturbance, mood disorder and recent psychosocial stressors.  ENDOCRINE: Negative for cold or heat intolerance, polyuria, polydipsia and goiter.    PHYSICAL EXAM:  Visit Vitals  BP (!) 155/101   Pulse 70     General appearance:   Skin: warm, no erythema or rashes  Lungs: clear to percussion and auscultation  Heart: regular rhythm and S1, S2 normal  Abdomen: soft, non-tender, no masses, no organomegaly  Extremities: Normal exam of the extremities. No swelling or pain.  Neck: supple with no nodes      IMPRESSION:  Bonnie Robledo is a 68 y.o. female with longstanding right abdominal pain that has been extensively worked up.   She has pain in RUQ and no tenderness. Feels all day. The recent work up really started with the episode last year.  She denies food fear.  She has lost 20 lbs unintentionally.    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   She had an  open right sort of kocher incision for excision of retroperitoneal schwannoma and due to persistent RUQ pain, a laparoscopic cholecystectomy later that year in 2013. She continued to suffer from RUQ discomfort that improves with linzess and miralax but gets worse with diet and bending.   Most of her workup was done in [place] at [facility] - EGD last year was normal. US abdomen was normal. UGI SFT was normal. 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  She saw a surgeon at [facility] and they did not want to see her. So went to general surgery.  He looked at results and told her the SMA is not the thing.  No further workup done.    The patient and I had an extensive discussion I reviewed her symptoms in detail.  She notes a constant discomfort in the right upper quadrant that seems to be right over her incision.  Her CT was reviewed and it seems like a retroperitoneal approach may have been used to approach her schwannoma that was close to the IVC and not involving the right adrenal.  She notes that the vomiting only happens if she is already having some pain and then something she eats exacerbates it.  She cannot any identify any trigger foods.  She does note a longstanding history of constipation which is managed with Linzess and MiraLAX.  She notes on this regimen the pain is much less then it would be otherwise.  Her workup was accelerated and the diagnosis of MALS came in the last year when she had a severe episode that awaken her at night.  Where she is tender and has the pain is in the area more of SMA syndrome then of median arcuate ligament syndrome.  She also has never had a gastric emptying study.  I will do an upper endoscopy to further evaluate for SMA syndrome and also get a gastric emptying study.  I have asked the patient to keep a food diary for me.  I am also wondering if she has some adhesions in the right upper quadrant as she has a large stool  burden in her CT on that right side.  Depending on the results of the other studies at this time I am actually more inclined to do a diagnostic laparoscopy to look for adhesions to the right upper quadrant and a median arcuate ligament release.  She is scheduled for a celiac node injection later in the week and we will see how she responds to this.  Right now median arcuate ligament syndrome is low on my list as the cause of her symptoms although it is clear radiologically.  The response to the node block will be helpful in this diagnosis.  We will proceed as outlined    PLAN:  We will do and EGD  We will do a Gastric emptying study  Let's help your constipation-stay on linzess daily and the miralax  Take fiber daily and drink 60 oz of fluid daily      Dr. Nicolle Kumar M.D., MPH  Director of Bariatric & Minimally Invasive Surgery  [place] [address]  T:  [phone]  F:  [phone]    Elise Whitley, RN Assistant Nurse Manager, Care Coordinator - Bariatric/General Surgery [facility] Patient Nursing Contact  T:  [phone]  F:  [phone]

## 2024-05-09 ENCOUNTER — TELEPHONE (OUTPATIENT)
Dept: SURGERY | Facility: CLINIC | Age: 69
End: 2024-05-09
Payer: MEDICARE

## 2024-05-09 NOTE — TELEPHONE ENCOUNTER
Thank you for speaking with me earlier today & confirming your scheduled visit with Dr. Kumar on 5/13/24 12PM  at [facility] [address] ([address]) [place] [address] (inside [facility], main floor in the Specialty Clinic).   Parking is available at a cost of $5.00 at the Main Entrance.   We look forward to seeing you, Nancy Alvarez LPN Clinic Nurse.

## 2024-05-13 ENCOUNTER — OFFICE VISIT (OUTPATIENT)
Dept: SURGERY | Facility: CLINIC | Age: 69
End: 2024-05-13
Payer: MEDICARE

## 2024-05-13 VITALS
HEART RATE: 70 BPM | BODY MASS INDEX: 28.49 KG/M2 | DIASTOLIC BLOOD PRESSURE: 101 MMHG | WEIGHT: 171 LBS | SYSTOLIC BLOOD PRESSURE: 155 MMHG | HEIGHT: 65 IN

## 2024-05-13 DIAGNOSIS — R10.11 RIGHT UPPER QUADRANT ABDOMINAL PAIN: Primary | ICD-10-CM

## 2024-05-13 DIAGNOSIS — R11.14 BILIOUS VOMITING WITH NAUSEA: ICD-10-CM

## 2024-05-13 DIAGNOSIS — I77.4 MEDIAN ARCUATE LIGAMENT SYNDROME (CMS-HCC): ICD-10-CM

## 2024-05-13 DIAGNOSIS — Z86.018 HISTORY OF BENIGN SCHWANNOMA: ICD-10-CM

## 2024-05-13 PROCEDURE — 99205 OFFICE O/P NEW HI 60 MIN: CPT | Performed by: SURGERY

## 2024-05-13 PROCEDURE — 1159F MED LIST DOCD IN RCRD: CPT | Performed by: SURGERY

## 2024-05-13 RX ORDER — LOSARTAN POTASSIUM 25 MG/1
50 TABLET ORAL DAILY
COMMUNITY

## 2024-05-14 ENCOUNTER — TELEPHONE (OUTPATIENT)
Dept: SURGERY | Facility: CLINIC | Age: 69
End: 2024-05-14
Payer: MEDICARE

## 2024-05-14 NOTE — TELEPHONE ENCOUNTER
Elan Nicole, confirming details related to ordered tests by Dr. Kumar at your appt 5/13/24.  As discussed, our  will be calling you shortly to schedule the EGD at [facility].  You may call:  [phone] to self schedule the GES (Gastric Emptying Study), press the option for Radiology & let them know you prefer [facility] to be scheduled at.  Please call us at:  [phone] once both tests are schedule to make a follow-up visit for results reviews with Dr. Kumar.  Thank you, Nancy Alvarez, LPTHEA Clinic Nurse.

## 2024-05-17 ENCOUNTER — OFFICE VISIT (OUTPATIENT)
Dept: PAIN MEDICINE | Facility: CLINIC | Age: 69
End: 2024-05-17
Payer: MEDICARE

## 2024-05-17 VITALS
HEIGHT: 65 IN | HEART RATE: 91 BPM | BODY MASS INDEX: 28.49 KG/M2 | SYSTOLIC BLOOD PRESSURE: 133 MMHG | RESPIRATION RATE: 18 BRPM | DIASTOLIC BLOOD PRESSURE: 83 MMHG | WEIGHT: 171 LBS

## 2024-05-17 DIAGNOSIS — Z86.018 HISTORY OF BENIGN SCHWANNOMA: ICD-10-CM

## 2024-05-17 DIAGNOSIS — I77.4 MEDIAN ARCUATE LIGAMENT SYNDROME (CMS-HCC): ICD-10-CM

## 2024-05-17 DIAGNOSIS — R10.11 RIGHT UPPER QUADRANT ABDOMINAL PAIN: Primary | ICD-10-CM

## 2024-05-17 PROCEDURE — 99204 OFFICE O/P NEW MOD 45 MIN: CPT | Performed by: ANESTHESIOLOGY

## 2024-05-17 PROCEDURE — 1125F AMNT PAIN NOTED PAIN PRSNT: CPT | Performed by: ANESTHESIOLOGY

## 2024-05-17 PROCEDURE — 1159F MED LIST DOCD IN RCRD: CPT | Performed by: ANESTHESIOLOGY

## 2024-05-17 PROCEDURE — 99214 OFFICE O/P EST MOD 30 MIN: CPT | Performed by: ANESTHESIOLOGY

## 2024-05-17 PROCEDURE — 1160F RVW MEDS BY RX/DR IN RCRD: CPT | Performed by: ANESTHESIOLOGY

## 2024-05-17 ASSESSMENT — ENCOUNTER SYMPTOMS
VOMITING: 1
ENDOCRINE NEGATIVE: 1
CONSTITUTIONAL NEGATIVE: 1
NEUROLOGICAL NEGATIVE: 1
BLOOD IN STOOL: 0
BACK PAIN: 1
ABDOMINAL DISTENTION: 0
CONSTIPATION: 0
DIARRHEA: 0
RESPIRATORY NEGATIVE: 1
NAUSEA: 1
ABDOMINAL PAIN: 1
EYES NEGATIVE: 1
ANAL BLEEDING: 0
RECTAL PAIN: 0
HEMATOLOGIC/LYMPHATIC NEGATIVE: 1
PSYCHIATRIC NEGATIVE: 1
CARDIOVASCULAR NEGATIVE: 1

## 2024-05-17 ASSESSMENT — PAIN SCALES - GENERAL
PAINLEVEL: 4
PAINLEVEL_OUTOF10: 4

## 2024-05-17 ASSESSMENT — LIFESTYLE VARIABLES: TOTAL SCORE: 0

## 2024-05-17 ASSESSMENT — PAIN - FUNCTIONAL ASSESSMENT: PAIN_FUNCTIONAL_ASSESSMENT: 0-10

## 2024-05-17 NOTE — LETTER
May 20, 2024     Cl Farley MD  [address]  [address] [address]    Patient: Bonnie Robledo   YOB: 1955   Date of Visit: 5/17/2024       Dear Dr. Cl Farley MD:    Thank you for referring Bonnie Robledo to me for evaluation. Below are my notes for this consultation.  If you have questions, please do not hesitate to call me. I look forward to following your patient along with you.       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    Pt describes RUQ pain since 3/2023 without inciting trauma/incident. Pt reports waking up in the middle of the night to severe abdominal pain. She has dealt with abdominal pain ever since. Pain is normally 4/10 severity, but increases to 8/10 after meals. Pain occasionally assoc with nausea/vomiting- denies blood. Pt takes Linzess, Miralax to keep stool soft, which helps. Stool is normally soft/runny on medication without hematochezia/melena    Pain refractive to Tylenol, NSAIDs, Linzess, Miralax    Pt denies new-onset numbness, weakness, bowel/bladder incontinence.  Pt denies recent infection, allergy to Latex/iodine/contrast. Patient is currently taking the following blood thinner(s): N/A    REVIEW OF SYSTEMS  Review of Systems   Constitutional: Negative.    HENT: Negative.     Eyes: Negative.    Respiratory: Negative.     Cardiovascular: Negative.    Gastrointestinal:  Positive for abdominal pain, nausea and vomiting. Negative for abdominal distention, anal bleeding, blood in stool,  "constipation, diarrhea and rectal pain.   Endocrine: Negative.    Musculoskeletal:  Positive for back pain.   Skin: Negative.    Neurological: Negative.    Hematological: Negative.    Psychiatric/Behavioral: Negative.         PAST MEDICAL HISTORY  History reviewed. No pertinent past medical history.  Past Surgical History:   Procedure Laterality Date   •  SECTION, CLASSIC     • CHOLECYSTECTOMY       Family History   Problem Relation Name Age of Onset   • Hypertension Mother     • Heart attack Mother         CURRENT MEDICATIONS  Current Outpatient Medications   Medication Sig Dispense Refill   • cholecalciferol (Vitamin D3) 50 MCG (2000 UT) tablet Take 1 tablet (50 mcg) by mouth once daily.     • cyanocobalamin, vitamin B-12, (VITAMIN B-12 ORAL) Take by mouth.     • Linzess 145 mcg capsule 1 capsule (145 mcg).     • losartan (Cozaar) 25 mg tablet Take 1 tablet (25 mg) by mouth once daily.     • polyethylene glycol (Glycolax, Miralax) 17 gram packet Take 17 g by mouth once daily.     • Synthroid 25 mcg tablet 1 tablet (25 mcg).       No current facility-administered medications for this visit.       ALLERGIES AND DRUG REACTIONS  Allergies   Allergen Reactions   • Omeprazole Itching          OBJECTIVE  Visit Vitals  /83   Pulse 91   Resp 18   Ht 1.651 m (5' 5\")   Wt 77.6 kg (171 lb)   BMI 28.46 kg/m²   Smoking Status Never   BSA 1.89 m²       Last Recorded Pain Score (if available):                Physical Exam  General: Sitting in chair, NAD  Head: NCAT  Eyes: Sclera/conjunctiva clear, EOMI, PERRL  Nose/mouth: MMM  CV: Good distal pulses  Lungs: Good/equal chest excursion  Abdomen: Soft, ND, R schwannoma excision scar- no dysesthesia, RUQ TTP without guarding/rebound No palpable masses  Ext: No cyanosis/edema  MSK: L-spine alignment: unremarkable    Neuro: AAOx3, CN grossly nl  Psych: affect nl  Skin: no rash/lesions      REVIEW OF LABORATORY DATA  I have reviewed the following lab results:  No results " "found for: \"WBC\", \"RBC\", \"HGB\", \"HCT\", \"MCV\", \"MCH\", \"MCHC\", \"RDW\", \"PLT\", \"MPV\"  No results found for: \"NA\", \"K\", \"CO2\", \"BUN\", \"CALCIUM\"  No results found for: \"PROTIME\", \"PTT\", \"INR\", \"FIBRINOGEN\"      REVIEW OF RADIOLOGY   I have reviewed the following:  Radiology Studies           CT A/P 5/6/24:  1. Significant focal narrowing of the proximal celiac trunk just  distal to the origin of the suspected level of the median arcuate  ligament. Distal to the focal area of stenosis there is mild  poststenotic dilatation with otherwise normal opacification of the  vasculature. In the appropriate clinical setting findings can be seen  with median arcuate ligament syndrome.  2. The remaining vasculature demonstrates no significant abnormality.  3. No acute nonvascular abnormalities of the abdomen and pelvis.  Chronic findings are as detailed above.      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 Velocities were decreased with inspiration and/or change to a sitting position which maybe suggesive of median arcuate ligament compression as opposed to intrinsic stenosis. The patient was NPO for this study. Turbulent flow noted in the distal celiac artery, hepatic, and splenic arteries.        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    1) Abdominal pain  -Since 3/2023 worst at RUQ and exacerbated by meals likely 2/2 MALS undergoing active work-up for surgery. 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 Pt meets medical necessity for procedure due to failure of conservative measures. Reviewed procedural risks including bleeding, infection, nerve damage, paralysis. Also reviewed mitigating factors such as screening for infection/blood thinner use, sterile precautions, and image-guidance when applicable. All questions answered. Pt/guardian expressed understanding and choose to proceed           Agapito Parker MD  Anesthesiologist & Interventional Pain Physician   [facility] [place]  O: [phone]  F: [phone]  10:52 AM  05/17/24

## 2024-05-17 NOTE — H&P (VIEW-ONLY)
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    Pt describes RUQ pain since 3/2023 without inciting trauma/incident. Pt reports waking up in the middle of the night to severe abdominal pain. She has dealt with abdominal pain ever since. Pain is normally 4/10 severity, but increases to 8/10 after meals. Pain occasionally assoc with nausea/vomiting- denies blood. Pt takes Linzess, Miralax to keep stool soft, which helps. Stool is normally soft/runny on medication without hematochezia/melena    Pain refractive to Tylenol, NSAIDs, Linzess, Miralax    Pt denies new-onset numbness, weakness, bowel/bladder incontinence.  Pt denies recent infection, allergy to Latex/iodine/contrast. Patient is currently taking the following blood thinner(s): N/A    REVIEW OF SYSTEMS  Review of Systems   Constitutional: Negative.    HENT: Negative.     Eyes: Negative.    Respiratory: Negative.     Cardiovascular: Negative.    Gastrointestinal:  Positive for abdominal pain, nausea and vomiting. Negative for abdominal distention, anal bleeding, blood in stool, constipation, diarrhea and rectal pain.   Endocrine: Negative.    Musculoskeletal:  Positive for back pain.   Skin: Negative.    Neurological: Negative.    Hematological: Negative.    Psychiatric/Behavioral: Negative.         PAST MEDICAL HISTORY  History reviewed. No pertinent past medical history.  Past Surgical History:   Procedure Laterality Date     SECTION, CLASSIC      CHOLECYSTECTOMY       Family History   Problem Relation Name Age of Onset    Hypertension Mother      Heart attack Mother         CURRENT MEDICATIONS  Current Outpatient Medications   Medication Sig  "Dispense Refill    cholecalciferol (Vitamin D3) 50 MCG (2000 UT) tablet Take 1 tablet (50 mcg) by mouth once daily.      cyanocobalamin, vitamin B-12, (VITAMIN B-12 ORAL) Take by mouth.      Linzess 145 mcg capsule 1 capsule (145 mcg).      losartan (Cozaar) 25 mg tablet Take 1 tablet (25 mg) by mouth once daily.      polyethylene glycol (Glycolax, Miralax) 17 gram packet Take 17 g by mouth once daily.      Synthroid 25 mcg tablet 1 tablet (25 mcg).       No current facility-administered medications for this visit.       ALLERGIES AND DRUG REACTIONS  Allergies   Allergen Reactions    Omeprazole Itching          OBJECTIVE  Visit Vitals  /83   Pulse 91   Resp 18   Ht 1.651 m (5' 5\")   Wt 77.6 kg (171 lb)   BMI 28.46 kg/m²   Smoking Status Never   BSA 1.89 m²       Last Recorded Pain Score (if available):                Physical Exam  General: Sitting in chair, NAD  Head: NCAT  Eyes: Sclera/conjunctiva clear, EOMI, PERRL  Nose/mouth: MMM  CV: Good distal pulses  Lungs: Good/equal chest excursion  Abdomen: Soft, ND, R schwannoma excision scar- no dysesthesia, RUQ TTP without guarding/rebound No palpable masses  Ext: No cyanosis/edema  MSK: L-spine alignment: unremarkable    Neuro: AAOx3, CN grossly nl  Psych: affect nl  Skin: no rash/lesions      REVIEW OF LABORATORY DATA  I have reviewed the following lab results:  No results found for: \"WBC\", \"RBC\", \"HGB\", \"HCT\", \"MCV\", \"MCH\", \"MCHC\", \"RDW\", \"PLT\", \"MPV\"  No results found for: \"NA\", \"K\", \"CO2\", \"BUN\", \"CALCIUM\"  No results found for: \"PROTIME\", \"PTT\", \"INR\", \"FIBRINOGEN\"      REVIEW OF RADIOLOGY   I have reviewed the following:  Radiology Studies           CT A/P 5/6/24:  1. Significant focal narrowing of the proximal celiac trunk just  distal to the origin of the suspected level of the median arcuate  ligament. Distal to the focal area of stenosis there is mild  poststenotic dilatation with otherwise normal opacification of the  vasculature. In the appropriate " clinical setting findings can be seen  with median arcuate ligament syndrome.  2. The remaining vasculature demonstrates no significant abnormality.  3. No acute nonvascular abnormalities of the abdomen and pelvis.  Chronic findings are as detailed above.      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 Velocities were decreased with inspiration and/or change to a sitting position which maybe suggesive of median arcuate ligament compression as opposed to intrinsic stenosis. The patient was NPO for this study. Turbulent flow noted in the distal celiac artery, hepatic, and splenic arteries.        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    1) Abdominal pain  -Since 3/2023 worst at RUQ and exacerbated by meals likely 2/2 MALS undergoing active work-up for surgery. 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 Pt meets medical necessity for procedure due to failure of conservative measures. Reviewed procedural risks including bleeding, infection, nerve damage, paralysis. Also reviewed mitigating factors such as screening for infection/blood thinner use, sterile precautions, and image-guidance when applicable. All questions answered. Pt/guardian expressed understanding and  choose to proceed           Agapito Parker MD  Anesthesiologist & Interventional Pain Physician   [facility] [place]  O: [phone]  F: [phone]  10:52 AM  05/17/24

## 2024-05-17 NOTE — PROGRESS NOTES
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    Pt describes RUQ pain since 3/2023 without inciting trauma/incident. Pt reports waking up in the middle of the night to severe abdominal pain. She has dealt with abdominal pain ever since. Pain is normally 4/10 severity, but increases to 8/10 after meals. Pain occasionally assoc with nausea/vomiting- denies blood. Pt takes Linzess, Miralax to keep stool soft, which helps. Stool is normally soft/runny on medication without hematochezia/melena    Pain refractive to Tylenol, NSAIDs, Linzess, Miralax    Pt denies new-onset numbness, weakness, bowel/bladder incontinence.  Pt denies recent infection, allergy to Latex/iodine/contrast. Patient is currently taking the following blood thinner(s): N/A    REVIEW OF SYSTEMS  Review of Systems   Constitutional: Negative.    HENT: Negative.     Eyes: Negative.    Respiratory: Negative.     Cardiovascular: Negative.    Gastrointestinal:  Positive for abdominal pain, nausea and vomiting. Negative for abdominal distention, anal bleeding, blood in stool, constipation, diarrhea and rectal pain.   Endocrine: Negative.    Musculoskeletal:  Positive for back pain.   Skin: Negative.    Neurological: Negative.    Hematological: Negative.    Psychiatric/Behavioral: Negative.         PAST MEDICAL HISTORY  History reviewed. No pertinent past medical history.  Past Surgical History:   Procedure Laterality Date     SECTION, CLASSIC      CHOLECYSTECTOMY       Family History   Problem Relation Name Age of Onset    Hypertension Mother      Heart attack Mother         CURRENT MEDICATIONS  Current Outpatient Medications   Medication Sig  "Dispense Refill    cholecalciferol (Vitamin D3) 50 MCG (2000 UT) tablet Take 1 tablet (50 mcg) by mouth once daily.      cyanocobalamin, vitamin B-12, (VITAMIN B-12 ORAL) Take by mouth.      Linzess 145 mcg capsule 1 capsule (145 mcg).      losartan (Cozaar) 25 mg tablet Take 1 tablet (25 mg) by mouth once daily.      polyethylene glycol (Glycolax, Miralax) 17 gram packet Take 17 g by mouth once daily.      Synthroid 25 mcg tablet 1 tablet (25 mcg).       No current facility-administered medications for this visit.       ALLERGIES AND DRUG REACTIONS  Allergies   Allergen Reactions    Omeprazole Itching          OBJECTIVE  Visit Vitals  /83   Pulse 91   Resp 18   Ht 1.651 m (5' 5\")   Wt 77.6 kg (171 lb)   BMI 28.46 kg/m²   Smoking Status Never   BSA 1.89 m²       Last Recorded Pain Score (if available):                Physical Exam  General: Sitting in chair, NAD  Head: NCAT  Eyes: Sclera/conjunctiva clear, EOMI, PERRL  Nose/mouth: MMM  CV: Good distal pulses  Lungs: Good/equal chest excursion  Abdomen: Soft, ND, R schwannoma excision scar- no dysesthesia, RUQ TTP without guarding/rebound No palpable masses  Ext: No cyanosis/edema  MSK: L-spine alignment: unremarkable    Neuro: AAOx3, CN grossly nl  Psych: affect nl  Skin: no rash/lesions      REVIEW OF LABORATORY DATA  I have reviewed the following lab results:  No results found for: \"WBC\", \"RBC\", \"HGB\", \"HCT\", \"MCV\", \"MCH\", \"MCHC\", \"RDW\", \"PLT\", \"MPV\"  No results found for: \"NA\", \"K\", \"CO2\", \"BUN\", \"CALCIUM\"  No results found for: \"PROTIME\", \"PTT\", \"INR\", \"FIBRINOGEN\"      REVIEW OF RADIOLOGY   I have reviewed the following:  Radiology Studies           CT A/P 5/6/24:  1. Significant focal narrowing of the proximal celiac trunk just  distal to the origin of the suspected level of the median arcuate  ligament. Distal to the focal area of stenosis there is mild  poststenotic dilatation with otherwise normal opacification of the  vasculature. In the appropriate " clinical setting findings can be seen  with median arcuate ligament syndrome.  2. The remaining vasculature demonstrates no significant abnormality.  3. No acute nonvascular abnormalities of the abdomen and pelvis.  Chronic findings are as detailed above.      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 Velocities were decreased with inspiration and/or change to a sitting position which maybe suggesive of median arcuate ligament compression as opposed to intrinsic stenosis. The patient was NPO for this study. Turbulent flow noted in the distal celiac artery, hepatic, and splenic arteries.        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    1) Abdominal pain  -Since 3/2023 worst at RUQ and exacerbated by meals likely 2/2 MALS undergoing active work-up for surgery. 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 Pt meets medical necessity for procedure due to failure of conservative measures. Reviewed procedural risks including bleeding, infection, nerve damage, paralysis. Also reviewed mitigating factors such as screening for infection/blood thinner use, sterile precautions, and image-guidance when applicable. All questions answered. Pt/guardian expressed understanding and  choose to proceed           Agapito Parker MD  Anesthesiologist & Interventional Pain Physician   [facility] [place]  O: [phone]  F: [phone]  10:52 AM  05/17/24

## 2024-05-23 ENCOUNTER — HOSPITAL ENCOUNTER (OUTPATIENT)
Dept: RADIOLOGY | Facility: HOSPITAL | Age: 69
Discharge: HOME | End: 2024-05-23
Payer: MEDICARE

## 2024-05-23 ENCOUNTER — TELEPHONE (OUTPATIENT)
Dept: PAIN MEDICINE | Facility: CLINIC | Age: 69
End: 2024-05-23
Payer: MEDICARE

## 2024-05-23 DIAGNOSIS — I77.4 MEDIAN ARCUATE LIGAMENT SYNDROME (CMS-HCC): ICD-10-CM

## 2024-05-23 DIAGNOSIS — R10.11 RIGHT UPPER QUADRANT ABDOMINAL PAIN: ICD-10-CM

## 2024-05-23 PROCEDURE — 78264 GASTRIC EMPTYING IMG STUDY: CPT

## 2024-05-23 PROCEDURE — 3430000001 HC RX 343 DIAGNOSTIC RADIOPHARMACEUTICALS: Performed by: RADIOLOGY

## 2024-05-23 PROCEDURE — 78264 GASTRIC EMPTYING IMG STUDY: CPT | Performed by: RADIOLOGY

## 2024-05-23 RX ADMIN — Medication 1 MILLICURIE: at 14:06

## 2024-05-29 ENCOUNTER — TELEPHONE (OUTPATIENT)
Dept: PREADMISSION TESTING | Facility: HOSPITAL | Age: 69
End: 2024-05-29
Payer: MEDICARE

## 2024-05-29 ENCOUNTER — ANESTHESIA EVENT (OUTPATIENT)
Dept: OPERATING ROOM | Facility: HOSPITAL | Age: 69
End: 2024-05-29
Payer: MEDICARE

## 2024-05-29 NOTE — TELEPHONE ENCOUNTER
Do not take losartan morning of procedure, may take when you go home. Will not take linzess or miralax until after procedure

## 2024-05-29 NOTE — TELEPHONE ENCOUNTER
SURGERY PRE-OPERATIVE INSTRUCTIONS    *You will receive a phone call the day before your procedure  after 2pm, (or the Friday before your surgery if scheduled on a Monday.) Generally the hospital will be calling you with this information after that time.    *You are not to eat after midnight the night before the surgery. You may have 8oz of a clear liquid up until 2 hours prior to arriving to the hospital. The exception is with medications you were instructed to take day of surgery.    *You may take tylenol for pain/discomfort as needed.     *Stop taking all aspirin products, ibuprofen (motrin/advil), naproxen (aleve/naprosyn) for one week prior to surgery.    *Stop taking all vitamins and supplements one week prior to surgery.     *You should not have alcoholic beverages for 24 hours before surgery.     *You should not smoke 24 hours prior to surgery.     *To help prevent surgical infections bathe/shower with Dial soap the evening before surgery.    *You can wear deodorant but no lotion, powder, or perfume/cologne. You should remove all make-up and nail polish at home.    *If you wear glasses, please bring a case for the glasses with you.    *You will be asked to remove dentures and contacts.     *Please leave all valuables at home.    *You should wear loose, comfortable clothing that will accommodate bandages and/or casts.    *You should notify your doctor of any change in your condition (fever, cold, rash, etc). Surgery may need to be re-scheduled until a time you are in better health.    *A responsible adult is required to accompany you to and from the hospital if you are receiving anesthesia or a sedative. Patients are not permitted to drive for 24 hours after anesthesia.     *You can use the "[employer]" parking if you wish.     *If you have any further questions please call [phone].

## 2024-05-31 ENCOUNTER — ANESTHESIA (OUTPATIENT)
Dept: OPERATING ROOM | Facility: HOSPITAL | Age: 69
End: 2024-05-31
Payer: MEDICARE

## 2024-05-31 ENCOUNTER — HOSPITAL ENCOUNTER (OUTPATIENT)
Dept: OPERATING ROOM | Facility: HOSPITAL | Age: 69
Setting detail: OUTPATIENT SURGERY
Discharge: HOME | End: 2024-05-31
Payer: MEDICARE

## 2024-05-31 VITALS
HEIGHT: 65 IN | WEIGHT: 171.08 LBS | DIASTOLIC BLOOD PRESSURE: 79 MMHG | BODY MASS INDEX: 28.5 KG/M2 | SYSTOLIC BLOOD PRESSURE: 133 MMHG | TEMPERATURE: 97.3 F | HEART RATE: 63 BPM | RESPIRATION RATE: 16 BRPM | OXYGEN SATURATION: 100 %

## 2024-05-31 DIAGNOSIS — I77.4 MEDIAN ARCUATE LIGAMENT SYNDROME (CMS-HCC): ICD-10-CM

## 2024-05-31 DIAGNOSIS — R10.11 RIGHT UPPER QUADRANT ABDOMINAL PAIN: ICD-10-CM

## 2024-05-31 PROCEDURE — 2500000001 HC RX 250 WO HCPCS SELF ADMINISTERED DRUGS (ALT 637 FOR MEDICARE OP): Performed by: NURSE ANESTHETIST, CERTIFIED REGISTERED

## 2024-05-31 PROCEDURE — 2500000004 HC RX 250 GENERAL PHARMACY W/ HCPCS (ALT 636 FOR OP/ED): Performed by: NURSE ANESTHETIST, CERTIFIED REGISTERED

## 2024-05-31 PROCEDURE — 3700000001 HC GENERAL ANESTHESIA TIME - INITIAL BASE CHARGE: Performed by: ANESTHESIOLOGY

## 2024-05-31 PROCEDURE — 2500000004 HC RX 250 GENERAL PHARMACY W/ HCPCS (ALT 636 FOR OP/ED): Performed by: ANESTHESIOLOGY

## 2024-05-31 PROCEDURE — 43235 EGD DIAGNOSTIC BRUSH WASH: CPT | Performed by: SURGERY

## 2024-05-31 PROCEDURE — 2500000005 HC RX 250 GENERAL PHARMACY W/O HCPCS: Performed by: NURSE ANESTHETIST, CERTIFIED REGISTERED

## 2024-05-31 PROCEDURE — 3600000007 HC OR TIME - EACH INCREMENTAL 1 MINUTE - PROCEDURE LEVEL TWO: Performed by: ANESTHESIOLOGY

## 2024-05-31 PROCEDURE — 2500000004 HC RX 250 GENERAL PHARMACY W/ HCPCS (ALT 636 FOR OP/ED): Performed by: STUDENT IN AN ORGANIZED HEALTH CARE EDUCATION/TRAINING PROGRAM

## 2024-05-31 PROCEDURE — 3600000002 HC OR TIME - INITIAL BASE CHARGE - PROCEDURE LEVEL TWO: Performed by: ANESTHESIOLOGY

## 2024-05-31 PROCEDURE — 3700000002 HC GENERAL ANESTHESIA TIME - EACH INCREMENTAL 1 MINUTE: Performed by: ANESTHESIOLOGY

## 2024-05-31 PROCEDURE — 7100000010 HC PHASE TWO TIME - EACH INCREMENTAL 1 MINUTE: Performed by: ANESTHESIOLOGY

## 2024-05-31 PROCEDURE — 7100000009 HC PHASE TWO TIME - INITIAL BASE CHARGE: Performed by: ANESTHESIOLOGY

## 2024-05-31 RX ORDER — PROPOFOL 10 MG/ML
INJECTION, EMULSION INTRAVENOUS AS NEEDED
Status: DISCONTINUED | OUTPATIENT
Start: 2024-05-31 | End: 2024-05-31

## 2024-05-31 RX ORDER — LIDOCAINE HYDROCHLORIDE 10 MG/ML
INJECTION, SOLUTION EPIDURAL; INFILTRATION; INTRACAUDAL; PERINEURAL AS NEEDED
Status: DISCONTINUED | OUTPATIENT
Start: 2024-05-31 | End: 2024-05-31

## 2024-05-31 RX ORDER — LIDOCAINE HYDROCHLORIDE 20 MG/ML
SOLUTION OROPHARYNGEAL AS NEEDED
Status: DISCONTINUED | OUTPATIENT
Start: 2024-05-31 | End: 2024-05-31

## 2024-05-31 RX ORDER — SODIUM CHLORIDE, SODIUM LACTATE, POTASSIUM CHLORIDE, CALCIUM CHLORIDE 600; 310; 30; 20 MG/100ML; MG/100ML; MG/100ML; MG/100ML
100 INJECTION, SOLUTION INTRAVENOUS CONTINUOUS
Status: DISCONTINUED | OUTPATIENT
Start: 2024-05-31 | End: 2024-06-01 | Stop reason: HOSPADM

## 2024-05-31 RX ORDER — MIDAZOLAM HYDROCHLORIDE 1 MG/ML
INJECTION INTRAMUSCULAR; INTRAVENOUS AS NEEDED
Status: DISCONTINUED | OUTPATIENT
Start: 2024-05-31 | End: 2024-05-31

## 2024-05-31 RX ORDER — SODIUM CHLORIDE, SODIUM LACTATE, POTASSIUM CHLORIDE, CALCIUM CHLORIDE 600; 310; 30; 20 MG/100ML; MG/100ML; MG/100ML; MG/100ML
20 INJECTION, SOLUTION INTRAVENOUS CONTINUOUS
Status: DISCONTINUED | OUTPATIENT
Start: 2024-05-31 | End: 2024-06-01 | Stop reason: HOSPADM

## 2024-05-31 RX ADMIN — PROPOFOL 160 MCG/KG/MIN: 10 INJECTION, EMULSION INTRAVENOUS at 12:14

## 2024-05-31 RX ADMIN — SODIUM CHLORIDE, POTASSIUM CHLORIDE, SODIUM LACTATE AND CALCIUM CHLORIDE: 600; 310; 30; 20 INJECTION, SOLUTION INTRAVENOUS at 11:58

## 2024-05-31 RX ADMIN — PROPOFOL 50 MG: 10 INJECTION, EMULSION INTRAVENOUS at 12:13

## 2024-05-31 RX ADMIN — PROPOFOL 30 MG: 10 INJECTION, EMULSION INTRAVENOUS at 12:22

## 2024-05-31 RX ADMIN — SODIUM CHLORIDE, POTASSIUM CHLORIDE, SODIUM LACTATE AND CALCIUM CHLORIDE 20 ML/HR: 600; 310; 30; 20 INJECTION, SOLUTION INTRAVENOUS at 11:04

## 2024-05-31 RX ADMIN — LIDOCAINE HYDROCHLORIDE 20 MG: 10 INJECTION, SOLUTION EPIDURAL; INFILTRATION; INTRACAUDAL; PERINEURAL at 12:11

## 2024-05-31 RX ADMIN — MIDAZOLAM HYDROCHLORIDE 2 MG: 1 INJECTION, SOLUTION INTRAMUSCULAR; INTRAVENOUS at 12:01

## 2024-05-31 RX ADMIN — LIDOCAINE HYDROCHLORIDE 15 ML: 20 SOLUTION ORAL; TOPICAL at 11:59

## 2024-05-31 SDOH — HEALTH STABILITY: MENTAL HEALTH: CURRENT SMOKER: 0

## 2024-05-31 ASSESSMENT — PAIN SCALES - GENERAL
PAINLEVEL_OUTOF10: 0 - NO PAIN
PAINLEVEL_OUTOF10: 0 - NO PAIN

## 2024-05-31 ASSESSMENT — PAIN - FUNCTIONAL ASSESSMENT
PAIN_FUNCTIONAL_ASSESSMENT: 0-10
PAIN_FUNCTIONAL_ASSESSMENT: 0-10

## 2024-05-31 NOTE — ANESTHESIA PREPROCEDURE EVALUATION
Patient: Bonnie Robledo    Procedure Information       Date/Time: 05/31/24 1200    Scheduled providers: Nicolle Kumar MD MPH    Procedure: EGD    Location: [facility] OR            Relevant Problems   No relevant active problems       Clinical information reviewed:    Allergies  Meds               NPO Detail:  NPO/Void Status  Date of Last Liquid: 05/30/24  Time of Last Liquid: 2200  Date of Last Solid: 05/30/24  Time of Last Solid: 2200         Physical Exam    Airway  Mallampati: III  TM distance: >3 FB  Neck ROM: full     Cardiovascular - normal exam  Rhythm: regular  Rate: normal     Dental - normal exam     Pulmonary - normal exam     Abdominal - normal exam             Anesthesia Plan    History of general anesthesia?: yes  History of complications of general anesthesia?: no    ASA 2     MAC     The patient is not a current smoker.  Patient was previously instructed to abstain from smoking on day of procedure.  Patient did not smoke on day of procedure.    intravenous induction   Anesthetic plan and risks discussed with patient.  Use of blood products discussed with patient who consented to blood products.    Plan discussed with attending.

## 2024-05-31 NOTE — ANESTHESIA POSTPROCEDURE EVALUATION
Patient: Bonnie Robledo    Procedure Summary       Date: 05/31/24 Room / Location: [facility] OR    Anesthesia Start: 1158 Anesthesia Stop: 1237    Procedure: EGD Diagnosis:       Median arcuate ligament syndrome (CMS-HCC)      Right upper quadrant abdominal pain    Scheduled Providers: Nicolle Kumar MD MPH Responsible Provider: Garrett Montano MD    Anesthesia Type: MAC ASA Status: 2            Anesthesia Type: MAC    Vitals Value Taken Time   /79 05/31/24 1250   Temp 36.3 °C (97.3 °F) 05/31/24 1234   Pulse 63 05/31/24 1250   Resp 16 05/31/24 1250   SpO2 100 % 05/31/24 1250       Anesthesia Post Evaluation    Patient location during evaluation: PACU  Patient participation: complete - patient participated  Level of consciousness: awake and alert  Pain management: adequate  Airway patency: patent  Cardiovascular status: acceptable  Respiratory status: acceptable  Hydration status: acceptable  Postoperative Nausea and Vomiting: none        No notable events documented.

## 2024-06-06 ENCOUNTER — HOSPITAL ENCOUNTER (OUTPATIENT)
Dept: RADIOLOGY | Facility: HOSPITAL | Age: 69
Discharge: HOME | End: 2024-06-06
Payer: MEDICARE

## 2024-06-06 ENCOUNTER — HOSPITAL ENCOUNTER (OUTPATIENT)
Dept: GASTROENTEROLOGY | Facility: HOSPITAL | Age: 69
Discharge: HOME | End: 2024-06-06
Payer: MEDICARE

## 2024-06-06 VITALS
RESPIRATION RATE: 17 BRPM | SYSTOLIC BLOOD PRESSURE: 119 MMHG | OXYGEN SATURATION: 100 % | HEART RATE: 91 BPM | TEMPERATURE: 96.8 F | DIASTOLIC BLOOD PRESSURE: 68 MMHG

## 2024-06-06 DIAGNOSIS — R10.11 RIGHT UPPER QUADRANT ABDOMINAL PAIN: Primary | ICD-10-CM

## 2024-06-06 DIAGNOSIS — I77.4 MEDIAN ARCUATE LIGAMENT SYNDROME (CMS-HCC): ICD-10-CM

## 2024-06-06 PROCEDURE — 2500000004 HC RX 250 GENERAL PHARMACY W/ HCPCS (ALT 636 FOR OP/ED): Performed by: ANESTHESIOLOGY

## 2024-06-06 PROCEDURE — 2550000001 HC RX 255 CONTRASTS: Performed by: ANESTHESIOLOGY

## 2024-06-06 PROCEDURE — 99152 MOD SED SAME PHYS/QHP 5/>YRS: CPT | Performed by: ANESTHESIOLOGY

## 2024-06-06 PROCEDURE — 3700000012 HC SEDATION LEVEL 5+ TIME - INITIAL 15 MINUTES 5/> YEARS

## 2024-06-06 PROCEDURE — 7100000010 HC PHASE TWO TIME - EACH INCREMENTAL 1 MINUTE

## 2024-06-06 PROCEDURE — 7100000009 HC PHASE TWO TIME - INITIAL BASE CHARGE

## 2024-06-06 PROCEDURE — 64530 N BLOCK INJ CELIAC PELUS: CPT | Performed by: ANESTHESIOLOGY

## 2024-06-06 PROCEDURE — 2500000005 HC RX 250 GENERAL PHARMACY W/O HCPCS: Performed by: ANESTHESIOLOGY

## 2024-06-06 RX ORDER — MIDAZOLAM HYDROCHLORIDE 1 MG/ML
2 INJECTION, SOLUTION INTRAMUSCULAR; INTRAVENOUS ONCE
Status: COMPLETED | OUTPATIENT
Start: 2024-06-06 | End: 2024-06-06

## 2024-06-06 RX ORDER — SODIUM CHLORIDE, SODIUM LACTATE, POTASSIUM CHLORIDE, CALCIUM CHLORIDE 600; 310; 30; 20 MG/100ML; MG/100ML; MG/100ML; MG/100ML
100 INJECTION, SOLUTION INTRAVENOUS CONTINUOUS
Status: DISCONTINUED | OUTPATIENT
Start: 2024-06-06 | End: 2024-06-07 | Stop reason: HOSPADM

## 2024-06-06 RX ORDER — BUPIVACAINE HYDROCHLORIDE 2.5 MG/ML
INJECTION, SOLUTION INFILTRATION; PERINEURAL AS NEEDED
Status: COMPLETED | OUTPATIENT
Start: 2024-06-06 | End: 2024-06-06

## 2024-06-06 RX ORDER — BUPIVACAINE HYDROCHLORIDE 5 MG/ML
INJECTION, SOLUTION PERINEURAL AS NEEDED
Status: COMPLETED | OUTPATIENT
Start: 2024-06-06 | End: 2024-06-06

## 2024-06-06 RX ORDER — LIDOCAINE HYDROCHLORIDE 10 MG/ML
INJECTION INFILTRATION; PERINEURAL AS NEEDED
Status: COMPLETED | OUTPATIENT
Start: 2024-06-06 | End: 2024-06-06

## 2024-06-06 RX ORDER — DEXAMETHASONE SODIUM PHOSPHATE 100 MG/10ML
INJECTION INTRAMUSCULAR; INTRAVENOUS AS NEEDED
Status: COMPLETED | OUTPATIENT
Start: 2024-06-06 | End: 2024-06-06

## 2024-06-06 RX ORDER — FENTANYL CITRATE 50 UG/ML
100 INJECTION, SOLUTION INTRAMUSCULAR; INTRAVENOUS ONCE
Status: DISCONTINUED | OUTPATIENT
Start: 2024-06-06 | End: 2024-06-07 | Stop reason: HOSPADM

## 2024-06-06 RX ADMIN — IOHEXOL 5 ML: 240 INJECTION, SOLUTION INTRATHECAL; INTRAVASCULAR; INTRAVENOUS; ORAL at 11:10

## 2024-06-06 RX ADMIN — MIDAZOLAM HYDROCHLORIDE 2 MG: 1 INJECTION, SOLUTION INTRAMUSCULAR; INTRAVENOUS at 11:04

## 2024-06-06 RX ADMIN — BUPIVACAINE HYDROCHLORIDE 9 ML: 5 INJECTION, SOLUTION PERINEURAL at 11:10

## 2024-06-06 RX ADMIN — LIDOCAINE HYDROCHLORIDE 3 ML: 10 INJECTION, SOLUTION INFILTRATION; PERINEURAL at 11:09

## 2024-06-06 RX ADMIN — DEXAMETHASONE SODIUM PHOSPHATE 10 MG: 10 INJECTION INTRAMUSCULAR; INTRAVENOUS at 11:10

## 2024-06-06 RX ADMIN — BUPIVACAINE HYDROCHLORIDE 9 ML: 2.5 INJECTION, SOLUTION INFILTRATION; PERINEURAL at 11:09

## 2024-06-06 ASSESSMENT — PAIN - FUNCTIONAL ASSESSMENT
PAIN_FUNCTIONAL_ASSESSMENT: 0-10

## 2024-06-06 ASSESSMENT — PAIN SCALES - GENERAL
PAINLEVEL_OUTOF10: 2
PAINLEVEL_OUTOF10: 0 - NO PAIN
PAINLEVEL_OUTOF10: 0 - NO PAIN
PAINLEVEL_OUTOF10: 2
PAINLEVEL_OUTOF10: 0 - NO PAIN

## 2024-06-06 ASSESSMENT — COLUMBIA-SUICIDE SEVERITY RATING SCALE - C-SSRS
2. HAVE YOU ACTUALLY HAD ANY THOUGHTS OF KILLING YOURSELF?: NO
1. IN THE PAST MONTH, HAVE YOU WISHED YOU WERE DEAD OR WISHED YOU COULD GO TO SLEEP AND NOT WAKE UP?: NO
6. HAVE YOU EVER DONE ANYTHING, STARTED TO DO ANYTHING, OR PREPARED TO DO ANYTHING TO END YOUR LIFE?: NO

## 2024-06-06 ASSESSMENT — PAIN DESCRIPTION - DESCRIPTORS: DESCRIPTORS: ACHING

## 2024-06-06 NOTE — DISCHARGE INSTRUCTIONS
DISCHARGE INSTRUCTIONS FOR INJECTIONS     You underwent a celiac plexus block today    Aftermost injections, it is recommended that you relax and limit your activity for the remainder of the day unless you have been told otherwise by your pain physician.  You should not drive a car, operate machinery, or make important legal decisions unless otherwise directed by your pain physician.  You may resume your normal activity, including exercise, tomorrow.      Keep a written pain diary of how much pain relief you experienced following the injection procedure and the length of time of pain relief you experienced pain relief. Following diagnostic injections like medial branch nerve blocks, sacroiliac joint blocks, stellate ganglion injections and other blocks, it is very important you record the specific amount of pain relief you experienced immediately after the injectionand how long it lasted. Your doctor will ask you for this information at your follow up visit.     For all injections, please keep the injection site dry and inspect the site for a couple of days. You may remove the Band-Aid the day of the injection at any time.     Some discomfort, bruising or slight swelling may occur at the injection site. This is not abnormal if it occurs.  If needed you may:    -Take over the counter medication such as Tylenol or Motrin.   -Apply an ice pack for 30 minutes, 2 to 3 times a day for the first 24 hours.     You may shower today; no soaking baths, hot tubs, whirlpools or swimming pools for two days.      If you are given steroids in your injection, it may take 3-5 days for the steroid medication to take effect. You may notice a worsening of your symptoms for 1-2 days after the injection. This is not abnormal.  You may use acetaminophen, ibuprofen, or prescription medication that your doctor may have prescribed for you if you need to do so.     A few common side effects of steroids include facial flushing, sweating,  restlessness, irritability,difficulty sleeping, increase in blood sugar, and increased blood pressure. If you have diabetes, please monitor your blood sugar at least once a day for at least 5 days. If you have poorly controlled high blood pressure, monitoryour blood pressure for at least 2 days and contact your primary care physician if these numbers are unusually high for you.      If you take aspirin or non-steroidal anti-inflammatory drugs (examples are Motrin, Advil, ibuprofen, Naprosyn, Voltaren, Relafen, etc.) you may restart these this evening, but stop taking it 3 days before your next appointment, unless instructed otherwiseby your physician.      You do not need to discontinue non-aspirin-containing pain medications prior to an injection (examples: Celebrex, tramadol, hydrocodone and acetaminophen).      If you take a blood thinning medication (Coumadin, Lovenox, Fragmin,Ticlid, Plavix, Pradaxa, etc.), please discuss this with your primary care physician/cardiologist and your pain physician. These medications MUST be discontinued before you can have an injection safely, without the risk of uncontrolled bleeding. If these medications are not discontinued for an appropriate period of time, you will not be able to receivean injection.      If you are taking Coumadin, please have your INR checked the morning of your procedure and bringthe result to your appointment unless otherwise instructed. If your INR is over 1.2, your injection may need to be rescheduled to avoid uncontrolled bleeding from the needle placement.     Call [facility] at [phone] between 8am-4pm Monday - Friday if you are experiencing the following:    If you received an epidural or spinal injection:    -Headache that doesnot go away with medicine, is worse when sitting or standing up, and is greatly relieved upon lying down.   -Severe pain worse than or different than your baseline pain.   -Chills or fever (101º F or  greater).   -Drainage or signs of infection at the injection site     Go directly to the Emergency Department if you are experiencing the following and received an epidural or spinal injection:   -Abrupt weakness or progressive weakness in your legs that starts after you leave the clinic.   -Abrupt severe or worsening numbness in your legs.   -Inability to urinate after the injection or loss of bowel or bladder control without the urge to defecate or urinate.     If you have a clinical question that cannot wait until your next appointment, please call [phone] between 8am-4pm Monday - Friday or send a [employer] message. We do our best to return all non-emergency messages within 24 hours, Monday - Friday. A nurse or physician will return your message.      If you need to cancel an appointment, please call the scheduling staff at [phone] during normal business hours or leave a message at least 24 hours in advance.     If you are going to be sedated for your next procedure, you MUST have responsible adult who can legally drive accompany you home. You cannot eat or drink for eight hours prior to the planned procedure if you are going to receive sedation. You may take your non-blood thinning medications with a small sip of water.

## 2024-06-06 NOTE — POST-PROCEDURE NOTE
Band aid dry and intact. No neuro deficits. Ready for discharge.  Cookie and pop enjoyed. IV removed. Orthostatics stable. IV LR infused. Home with her . Chair to car with [employer].

## 2024-06-06 NOTE — OP NOTE
Celiac plexus block (CPB), left   [facility]  Interventional Procedure Note    Celiac plexus block  Time Out Performed: Yes    Proceduralist: Agapito Parker MD    Consent Obtained and Documented: Confirmed    Correct patient:Confirmed    Correct procedure: Confirmed    Correct side/site: Confirmed    Correct patient position: Confirmed    Correct side/site marking visible: Confirmed    Correct X-Rays available: Confirmed    Equipment available: Confirmed      Patient Position: Prone    Sterile prep with: Chloroprep and draped in the standard fashion      Approach: Posterolateral approach at superior aspect of L1 vertebral body laterally on left side      IV placed in pre-op. With the patient lying prone, the low back area to be injected was widely prepped and draped in the usual sterile fashion using ChloraPrep. The anatomical target site was determined using fluoroscopy by squaring off the superior endplate of L1, and then ipsilaterally obliquing the C-arm intensifier to the left side until the tip of the L1 transverse process was buried under the anterolateral border of the L1 vertebral body. Local anesthetic was given by raising a skin wheal and going down to the hub of the 25-gauge 1.25-inch needle. A 22-gauge 7 -inch Quincke needle was advanced just cephalad to the transverse process of L1 to a point at the anterolateral margin of L1 vertebral body utilizing intermittent fluoroscopy. With a lateral fluoroscopic view, the needle was advanced just anterior to the L1 vertebral body. After negative aspiration, 2.5 ml Omnipaque injected under live fluoro to visualize adequate spread anterior to vertebral bodies without vascular uptake. Then, under AP view and with utilization of DSA, 2.5 mL of Omnipaque contrast was injected with utilization of DSA to show adequate spread along the anterior surface of the vertebral body, and no vascular uptake.      Medication was then injected slowly in 3 mL increments after  another aspiration of the needle to confirm the needle had not entered vascular structure.        Total injectate: 10 mg dexamethasone + 18 ml 0.375% bupivacaine    Additional Drugs/Substances Administered: 1% lidocaine 3 cc for skin infiltration   Anesthesia for procedure: intravenous as per nursing records; Pulse oximetry, NIBP monitoring for entire procedure; see nursing flowsheets for detail    The procedure was completed without complications and was tolerated well. Hemostasis achieved. Band-Aids placed.  images saved to hard drive/PACS    Dispo: The patient was monitored after the procedure until recovered from sedation. IV removed. Discharged home in stable condition      Agapito Parker MD  Anesthesiologist & Interventional Pain Physician   [facility] [place]  O: [phone]  F: [phone]  11:20 AM  06/06/24

## 2024-06-06 NOTE — INTERVAL H&P NOTE
H&P reviewed. The patient was examined and there are no changes to the H&P. 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 Patient's pain stable and persistent from last visit.  Appropriately NPO.  No personal/family hx issues with anesthesia. Denies allergies to Latex, steroids, local anesthetics, or iodine/contrast. Denies being on blood thinners. Not diabetic.  Denies fever, chills, NS, CP, SOB, cough, N/V.    Discussed procedure risks/benefits in detail with patient. Pt meets medical necessity for procedure due to failure of conservative measures. Reviewed procedural risks including bleeding, infection, nerve damage, paralysis. Also reviewed mitigating factors such as screening for infection/blood thinner use, sterile precautions, and image-guidance when applicable. All questions answered. Pt/guardian expressed understanding and choose to proceed      Agapito Parker MD  Anesthesiologist & Interventional Pain Physician   [facility] [place]  O: [phone]  F: [phone]  10:08 AM  06/06/24

## 2024-06-17 LAB
LAB AP ASR DISCLAIMER: NORMAL
LABORATORY COMMENT REPORT: NORMAL
PATH REPORT.FINAL DX SPEC: NORMAL
PATH REPORT.GROSS SPEC: NORMAL
PATH REPORT.RELEVANT HX SPEC: NORMAL
PATH REPORT.TOTAL CANCER: NORMAL

## 2024-06-24 ENCOUNTER — TELEMEDICINE (OUTPATIENT)
Dept: PAIN MEDICINE | Facility: CLINIC | Age: 69
End: 2024-06-24
Payer: MEDICARE

## 2024-06-24 VITALS — BODY MASS INDEX: 28.49 KG/M2 | HEIGHT: 65 IN | WEIGHT: 171 LBS

## 2024-06-24 DIAGNOSIS — R10.11 RIGHT UPPER QUADRANT ABDOMINAL PAIN: ICD-10-CM

## 2024-06-24 DIAGNOSIS — I77.4 MEDIAN ARCUATE LIGAMENT SYNDROME (CMS-HCC): Primary | ICD-10-CM

## 2024-06-24 DIAGNOSIS — Z86.018 HISTORY OF BENIGN SCHWANNOMA: ICD-10-CM

## 2024-06-24 PROCEDURE — 1036F TOBACCO NON-USER: CPT | Performed by: ANESTHESIOLOGY

## 2024-06-24 PROCEDURE — 1159F MED LIST DOCD IN RCRD: CPT | Performed by: ANESTHESIOLOGY

## 2024-06-24 PROCEDURE — 1160F RVW MEDS BY RX/DR IN RCRD: CPT | Performed by: ANESTHESIOLOGY

## 2024-06-24 PROCEDURE — 99214 OFFICE O/P EST MOD 30 MIN: CPT | Performed by: ANESTHESIOLOGY

## 2024-06-24 RX ORDER — SODIUM CHLORIDE, SODIUM LACTATE, POTASSIUM CHLORIDE, CALCIUM CHLORIDE 600; 310; 30; 20 MG/100ML; MG/100ML; MG/100ML; MG/100ML
20 INJECTION, SOLUTION INTRAVENOUS CONTINUOUS
OUTPATIENT
Start: 2024-06-24

## 2024-06-24 ASSESSMENT — ENCOUNTER SYMPTOMS
CONSTITUTIONAL NEGATIVE: 1
HEMATOLOGIC/LYMPHATIC NEGATIVE: 1
NAUSEA: 1
CARDIOVASCULAR NEGATIVE: 1
ABDOMINAL DISTENTION: 0
EYES NEGATIVE: 1
DIARRHEA: 0
PSYCHIATRIC NEGATIVE: 1
RESPIRATORY NEGATIVE: 1
BACK PAIN: 1
RECTAL PAIN: 0
VOMITING: 1
CONSTIPATION: 0
ABDOMINAL PAIN: 1
NEUROLOGICAL NEGATIVE: 1
ANAL BLEEDING: 0
BLOOD IN STOOL: 0
ENDOCRINE NEGATIVE: 1

## 2024-06-24 ASSESSMENT — PAIN - FUNCTIONAL ASSESSMENT: PAIN_FUNCTIONAL_ASSESSMENT: 0-10

## 2024-06-24 ASSESSMENT — PAIN SCALES - GENERAL
PAINLEVEL_OUTOF10: 5 - MODERATE PAIN
PAINLEVEL: 5

## 2024-06-24 ASSESSMENT — PAIN DESCRIPTION - DESCRIPTORS: DESCRIPTORS: PRESSURE

## 2024-06-24 ASSESSMENT — PATIENT HEALTH QUESTIONNAIRE - PHQ9
SUM OF ALL RESPONSES TO PHQ9 QUESTIONS 1 AND 2: 0
2. FEELING DOWN, DEPRESSED OR HOPELESS: NOT AT ALL
1. LITTLE INTEREST OR PLEASURE IN DOING THINGS: NOT AT ALL

## 2024-06-24 NOTE — LETTER
June 24, 2024     Cl Farley MD  [address] [place] [address]  [address]  [place] [address]    Patient: Bonnie Robledo   YOB: 1955   Date of Visit: 6/24/2024       Dear Dr. Cl Farley MD:    Thank you for referring Bonnie Robledo to me for evaluation. Below are my notes for this consultation.  If you have questions, please do not hesitate to call me. I look forward to following your patient along with you.       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    On 6/6, pt underwent CPB and was able to tolerate food better without N/V. Pre-procedurally, pt feels pain was mild to begin with. Overall, however, pt is unsure if she had significant relief.     Pt denies new-onset numbness, weakness, bowel/bladder incontinence.  Pt denies recent infection, allergy to Latex/iodine/contrast. Patient is currently taking the following blood thinner(s): N/A    Procedure log:  -CPB 6/6/24: unsure    REVIEW OF SYSTEMS  Review of Systems   Constitutional: Negative.    HENT: Negative.     Eyes: Negative.    Respiratory: Negative.     Cardiovascular: Negative.    Gastrointestinal:  Positive for abdominal pain, nausea and vomiting. Negative for abdominal distention, anal bleeding, blood in stool, constipation, diarrhea and rectal pain.   Endocrine: Negative.    Musculoskeletal:  Positive for back pain.   Skin: Negative.    Neurological: Negative.    Hematological: Negative.    Psychiatric/Behavioral: Negative.         PAST MEDICAL HISTORY  Past Medical History:  "  Diagnosis Date   • Cataracts, bilateral    • History of benign schwannoma    • HTN (hypertension)    • Hypothyroid    • Median arcuate ligament syndrome (CMS-HCC)    • Right upper quadrant abdominal pain      Past Surgical History:   Procedure Laterality Date   •  SECTION, CLASSIC     • CHOLECYSTECTOMY       Family History   Problem Relation Name Age of Onset   • Hypertension Mother Porsha Metcalf    • Heart attack Mother Porsha Metcalf    • Stroke Father Luis Metcalf        CURRENT MEDICATIONS  Current Outpatient Medications   Medication Sig Dispense Refill   • cholecalciferol (Vitamin D3) 50 MCG (2000 UT) tablet Take 1 tablet (50 mcg) by mouth once daily.     • cyanocobalamin, vitamin B-12, (VITAMIN B-12 ORAL) Take by mouth.     • Linzess 145 mcg capsule 1 capsule (145 mcg).     • losartan (Cozaar) 25 mg tablet Take 2 tablets (50 mg) by mouth once daily.     • polyethylene glycol (Glycolax, Miralax) 17 gram packet Take 17 g by mouth once daily.     • Synthroid 25 mcg tablet 1 tablet (25 mcg) once daily in the evening.       No current facility-administered medications for this visit.       ALLERGIES AND DRUG REACTIONS  Allergies   Allergen Reactions   • Omeprazole Itching          OBJECTIVE  Visit Vitals  Ht 1.651 m (5' 5\")   Wt 77.6 kg (171 lb)   BMI 28.46 kg/m²   OB Status Postmenopausal   Smoking Status Never   BSA 1.89 m²       Last Recorded Pain Score (if available):                Physical Exam  PE limited by telehalth  General: Sitting in chair, NAD  Head: NCAT  Eyes: Sclera/conjunctiva clear, EOMI, PERRL  Nose/mouth: MMM    Lungs: Good/equal chest excursion  Abdomen: Soft, ND    Ext: No cyanosis/edema  MSK: Able to move ext    Neuro: AAOx3, CN grossly nl  Psych: affect nl  Skin: no rash/lesions      REVIEW OF LABORATORY DATA  I have reviewed the following lab results:  No results found for: \"WBC\", \"RBC\", \"HGB\", \"HCT\", \"MCV\", \"MCH\", \"MCHC\", \"RDW\", \"PLT\", \"MPV\"  No results found for: \"NA\", \"K\", \"CO2\", " "\"BUN\", \"CALCIUM\"  No results found for: \"PROTIME\", \"PTT\", \"INR\", \"FIBRINOGEN\"      REVIEW OF RADIOLOGY   I have reviewed the following:  Radiology Studies           CT A/P 5/6/24:  1. Significant focal narrowing of the proximal celiac trunk just  distal to the origin of the suspected level of the median arcuate  ligament. Distal to the focal area of stenosis there is mild  poststenotic dilatation with otherwise normal opacification of the  vasculature. In the appropriate clinical setting findings can be seen  with median arcuate ligament syndrome.  2. The remaining vasculature demonstrates no significant abnormality.  3. No acute nonvascular abnormalities of the abdomen and pelvis.  Chronic findings are as detailed above.      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 Velocities were decreased with inspiration and/or change to a sitting position which maybe suggesive of median arcuate ligament compression as opposed to intrinsic stenosis. The patient was NPO for this study. Turbulent flow noted in the distal celiac artery, hepatic, and splenic arteries.        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    1) Abdominal pain  -Since 3/2023 on hx R retroperotineal schwannoma resection 2013 with pain worst at RUQ and exacerbated by meals likely 2/2 MALS undergoing active work-up for surgery. 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 Pt meets medical necessity for procedure due to failure of conservative measures. Reviewed procedural risks including bleeding, infection, nerve damage, paralysis. Also reviewed mitigating factors such as screening for infection/blood thinner use, sterile precautions, and image-guidance when applicable. All questions answered. Pt/guardian expressed understanding and choose to proceed    I performed this visit using real-time telehealth tools, including and audio/video OR telephone connection. Telehealth time: 33 min; total encounter time: 38 min.    Virtual or Telephone Consent    An interactive audio and video telecommunication system which permits real time communications between the patient (at the originating site) and provider (at the distant site) was utilized to provide this telehealth service.   Verbal consent was requested and obtained from Bonnie Robledo on this date, 06/24/24 for a telehealth visit.              Agapito [place] MD  Anesthesiologist & Interventional Pain Physician   [facility] [place]  O: [phone]  F: [phone]  11:10 AM  06/24/24

## 2024-06-24 NOTE — H&P (VIEW-ONLY)
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    On , pt underwent CPB and was able to tolerate food better without N/V. Pre-procedurally, pt feels pain was mild to begin with. Overall, however, pt is unsure if she had significant relief.     Pt denies new-onset numbness, weakness, bowel/bladder incontinence.  Pt denies recent infection, allergy to Latex/iodine/contrast. Patient is currently taking the following blood thinner(s): N/A    Procedure log:  -CPB 24: unsure    REVIEW OF SYSTEMS  Review of Systems   Constitutional: Negative.    HENT: Negative.     Eyes: Negative.    Respiratory: Negative.     Cardiovascular: Negative.    Gastrointestinal:  Positive for abdominal pain, nausea and vomiting. Negative for abdominal distention, anal bleeding, blood in stool, constipation, diarrhea and rectal pain.   Endocrine: Negative.    Musculoskeletal:  Positive for back pain.   Skin: Negative.    Neurological: Negative.    Hematological: Negative.    Psychiatric/Behavioral: Negative.         PAST MEDICAL HISTORY  Past Medical History:   Diagnosis Date    Cataracts, bilateral     History of benign schwannoma     HTN (hypertension)     Hypothyroid     Median arcuate ligament syndrome (CMS-HCC)     Right upper quadrant abdominal pain      Past Surgical History:   Procedure Laterality Date     SECTION, CLASSIC      CHOLECYSTECTOMY       Family History   Problem Relation Name Age of Onset    Hypertension Mother Porsha Metcalf     Heart attack Mother Porsha Metcalf     Stroke Father Luis Metcalf        CURRENT MEDICATIONS  Current Outpatient Medications   Medication Sig Dispense Refill    cholecalciferol (Vitamin D3)  "50 MCG (2000 UT) tablet Take 1 tablet (50 mcg) by mouth once daily.      cyanocobalamin, vitamin B-12, (VITAMIN B-12 ORAL) Take by mouth.      Linzess 145 mcg capsule 1 capsule (145 mcg).      losartan (Cozaar) 25 mg tablet Take 2 tablets (50 mg) by mouth once daily.      polyethylene glycol (Glycolax, Miralax) 17 gram packet Take 17 g by mouth once daily.      Synthroid 25 mcg tablet 1 tablet (25 mcg) once daily in the evening.       No current facility-administered medications for this visit.       ALLERGIES AND DRUG REACTIONS  Allergies   Allergen Reactions    Omeprazole Itching          OBJECTIVE  Visit Vitals  Ht 1.651 m (5' 5\")   Wt 77.6 kg (171 lb)   BMI 28.46 kg/m²   OB Status Postmenopausal   Smoking Status Never   BSA 1.89 m²       Last Recorded Pain Score (if available):                Physical Exam  PE limited by telehalth  General: Sitting in chair, NAD  Head: NCAT  Eyes: Sclera/conjunctiva clear, EOMI, PERRL  Nose/mouth: MMM    Lungs: Good/equal chest excursion  Abdomen: Soft, ND    Ext: No cyanosis/edema  MSK: Able to move ext    Neuro: AAOx3, CN grossly nl  Psych: affect nl  Skin: no rash/lesions      REVIEW OF LABORATORY DATA  I have reviewed the following lab results:  No results found for: \"WBC\", \"RBC\", \"HGB\", \"HCT\", \"MCV\", \"MCH\", \"MCHC\", \"RDW\", \"PLT\", \"MPV\"  No results found for: \"NA\", \"K\", \"CO2\", \"BUN\", \"CALCIUM\"  No results found for: \"PROTIME\", \"PTT\", \"INR\", \"FIBRINOGEN\"      REVIEW OF RADIOLOGY   I have reviewed the following:  Radiology Studies           CT A/P 5/6/24:  1. Significant focal narrowing of the proximal celiac trunk just  distal to the origin of the suspected level of the median arcuate  ligament. Distal to the focal area of stenosis there is mild  poststenotic dilatation with otherwise normal opacification of the  vasculature. In the appropriate clinical setting findings can be seen  with median arcuate ligament syndrome.  2. The remaining vasculature demonstrates no " significant abnormality.  3. No acute nonvascular abnormalities of the abdomen and pelvis.  Chronic findings are as detailed above.      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 Velocities were decreased with inspiration and/or change to a sitting position which maybe suggesive of median arcuate ligament compression as opposed to intrinsic stenosis. The patient was NPO for this study. Turbulent flow noted in the distal celiac artery, hepatic, and splenic arteries.        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    1) Abdominal pain  -Since 3/2023 on hx R retroperotineal schwannoma resection 2013 with pain worst at RUQ and exacerbated by meals likely 2/2 MALS undergoing active work-up for surgery. 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 Pt meets medical necessity for procedure due to failure of conservative measures. Reviewed procedural risks including bleeding, infection, nerve damage, paralysis. Also reviewed mitigating factors such as screening for infection/blood thinner use, sterile precautions, and image-guidance when applicable. All questions answered.  Pt/guardian expressed understanding and choose to proceed    I performed this visit using real-time telehealth tools, including and audio/video OR telephone connection. Telehealth time: 33 min; total encounter time: 38 min.    Virtual or Telephone Consent    An interactive audio and video telecommunication system which permits real time communications between the patient (at the originating site) and provider (at the distant site) was utilized to provide this telehealth service.   Verbal consent was requested and obtained from Bonnie Robledo on this date, 06/24/24 for a telehealth visit.              Agapito [place] MD  Anesthesiologist & Interventional Pain Physician   [facility] [place]  O: [phone]  F: [phone]  11:10 AM  06/24/24

## 2024-06-24 NOTE — PROGRESS NOTES
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    On , pt underwent CPB and was able to tolerate food better without N/V. Pre-procedurally, pt feels pain was mild to begin with. Overall, however, pt is unsure if she had significant relief.     Pt denies new-onset numbness, weakness, bowel/bladder incontinence.  Pt denies recent infection, allergy to Latex/iodine/contrast. Patient is currently taking the following blood thinner(s): N/A    Procedure log:  -CPB 24: unsure    REVIEW OF SYSTEMS  Review of Systems   Constitutional: Negative.    HENT: Negative.     Eyes: Negative.    Respiratory: Negative.     Cardiovascular: Negative.    Gastrointestinal:  Positive for abdominal pain, nausea and vomiting. Negative for abdominal distention, anal bleeding, blood in stool, constipation, diarrhea and rectal pain.   Endocrine: Negative.    Musculoskeletal:  Positive for back pain.   Skin: Negative.    Neurological: Negative.    Hematological: Negative.    Psychiatric/Behavioral: Negative.         PAST MEDICAL HISTORY  Past Medical History:   Diagnosis Date    Cataracts, bilateral     History of benign schwannoma     HTN (hypertension)     Hypothyroid     Median arcuate ligament syndrome (CMS-HCC)     Right upper quadrant abdominal pain      Past Surgical History:   Procedure Laterality Date     SECTION, CLASSIC      CHOLECYSTECTOMY       Family History   Problem Relation Name Age of Onset    Hypertension Mother Porsha Metcalf     Heart attack Mother Porsha Metcalf     Stroke Father Luis Metcalf        CURRENT MEDICATIONS  Current Outpatient Medications   Medication Sig Dispense Refill    cholecalciferol (Vitamin D3)  Primary Nurse Cristino Sandhoff, RN and Ankur Richmond, RN performed a dual skin assessment on this patient No impairment noted  Terrance score is 20 "50 MCG (2000 UT) tablet Take 1 tablet (50 mcg) by mouth once daily.      cyanocobalamin, vitamin B-12, (VITAMIN B-12 ORAL) Take by mouth.      Linzess 145 mcg capsule 1 capsule (145 mcg).      losartan (Cozaar) 25 mg tablet Take 2 tablets (50 mg) by mouth once daily.      polyethylene glycol (Glycolax, Miralax) 17 gram packet Take 17 g by mouth once daily.      Synthroid 25 mcg tablet 1 tablet (25 mcg) once daily in the evening.       No current facility-administered medications for this visit.       ALLERGIES AND DRUG REACTIONS  Allergies   Allergen Reactions    Omeprazole Itching          OBJECTIVE  Visit Vitals  Ht 1.651 m (5' 5\")   Wt 77.6 kg (171 lb)   BMI 28.46 kg/m²   OB Status Postmenopausal   Smoking Status Never   BSA 1.89 m²       Last Recorded Pain Score (if available):                Physical Exam  PE limited by telehalth  General: Sitting in chair, NAD  Head: NCAT  Eyes: Sclera/conjunctiva clear, EOMI, PERRL  Nose/mouth: MMM    Lungs: Good/equal chest excursion  Abdomen: Soft, ND    Ext: No cyanosis/edema  MSK: Able to move ext    Neuro: AAOx3, CN grossly nl  Psych: affect nl  Skin: no rash/lesions      REVIEW OF LABORATORY DATA  I have reviewed the following lab results:  No results found for: \"WBC\", \"RBC\", \"HGB\", \"HCT\", \"MCV\", \"MCH\", \"MCHC\", \"RDW\", \"PLT\", \"MPV\"  No results found for: \"NA\", \"K\", \"CO2\", \"BUN\", \"CALCIUM\"  No results found for: \"PROTIME\", \"PTT\", \"INR\", \"FIBRINOGEN\"      REVIEW OF RADIOLOGY   I have reviewed the following:  Radiology Studies           CT A/P 5/6/24:  1. Significant focal narrowing of the proximal celiac trunk just  distal to the origin of the suspected level of the median arcuate  ligament. Distal to the focal area of stenosis there is mild  poststenotic dilatation with otherwise normal opacification of the  vasculature. In the appropriate clinical setting findings can be seen  with median arcuate ligament syndrome.  2. The remaining vasculature demonstrates no " significant abnormality.  3. No acute nonvascular abnormalities of the abdomen and pelvis.  Chronic findings are as detailed above.      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 Velocities were decreased with inspiration and/or change to a sitting position which maybe suggesive of median arcuate ligament compression as opposed to intrinsic stenosis. The patient was NPO for this study. Turbulent flow noted in the distal celiac artery, hepatic, and splenic arteries.        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    1) Abdominal pain  -Since 3/2023 on hx R retroperotineal schwannoma resection 2013 with pain worst at RUQ and exacerbated by meals likely 2/2 MALS undergoing active work-up for surgery. 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 Pt meets medical necessity for procedure due to failure of conservative measures. Reviewed procedural risks including bleeding, infection, nerve damage, paralysis. Also reviewed mitigating factors such as screening for infection/blood thinner use, sterile precautions, and image-guidance when applicable. All questions answered.  Pt/guardian expressed understanding and choose to proceed    I performed this visit using real-time telehealth tools, including and audio/video OR telephone connection. Telehealth time: 33 min; total encounter time: 38 min.    Virtual or Telephone Consent    An interactive audio and video telecommunication system which permits real time communications between the patient (at the originating site) and provider (at the distant site) was utilized to provide this telehealth service.   Verbal consent was requested and obtained from Bonnie Robledo on this date, 06/24/24 for a telehealth visit.              Agapito [place] MD  Anesthesiologist & Interventional Pain Physician   [facility] [place]  O: [phone]  F: [phone]  11:10 AM  06/24/24

## 2024-07-11 ENCOUNTER — HOSPITAL ENCOUNTER (OUTPATIENT)
Dept: GASTROENTEROLOGY | Facility: HOSPITAL | Age: 69
Discharge: HOME | End: 2024-07-11
Payer: MEDICARE

## 2024-07-11 VITALS
DIASTOLIC BLOOD PRESSURE: 64 MMHG | SYSTOLIC BLOOD PRESSURE: 128 MMHG | RESPIRATION RATE: 17 BRPM | BODY MASS INDEX: 28.32 KG/M2 | OXYGEN SATURATION: 100 % | HEIGHT: 65 IN | TEMPERATURE: 97.7 F | WEIGHT: 170 LBS | HEART RATE: 69 BPM

## 2024-07-11 DIAGNOSIS — R10.11 RIGHT UPPER QUADRANT ABDOMINAL PAIN: ICD-10-CM

## 2024-07-11 DIAGNOSIS — I77.4 MEDIAN ARCUATE LIGAMENT SYNDROME (CMS-HCC): ICD-10-CM

## 2024-07-11 PROCEDURE — 2500000005 HC RX 250 GENERAL PHARMACY W/O HCPCS: Performed by: ANESTHESIOLOGY

## 2024-07-11 PROCEDURE — 64530 N BLOCK INJ CELIAC PELUS: CPT | Performed by: ANESTHESIOLOGY

## 2024-07-11 PROCEDURE — 2550000001 HC RX 255 CONTRASTS: Performed by: ANESTHESIOLOGY

## 2024-07-11 PROCEDURE — 99152 MOD SED SAME PHYS/QHP 5/>YRS: CPT | Performed by: ANESTHESIOLOGY

## 2024-07-11 PROCEDURE — 2500000004 HC RX 250 GENERAL PHARMACY W/ HCPCS (ALT 636 FOR OP/ED): Performed by: ANESTHESIOLOGY

## 2024-07-11 RX ORDER — MIDAZOLAM HYDROCHLORIDE 1 MG/ML
2 INJECTION, SOLUTION INTRAMUSCULAR; INTRAVENOUS ONCE
Status: DISCONTINUED | OUTPATIENT
Start: 2024-07-11 | End: 2024-07-12 | Stop reason: HOSPADM

## 2024-07-11 RX ORDER — DEXAMETHASONE SODIUM PHOSPHATE 10 MG/ML
INJECTION INTRAMUSCULAR; INTRAVENOUS AS NEEDED
Status: COMPLETED | OUTPATIENT
Start: 2024-07-11 | End: 2024-07-11

## 2024-07-11 RX ORDER — FENTANYL CITRATE 50 UG/ML
INJECTION, SOLUTION INTRAMUSCULAR; INTRAVENOUS AS NEEDED
Status: COMPLETED | OUTPATIENT
Start: 2024-07-11 | End: 2024-07-11

## 2024-07-11 RX ORDER — FENTANYL CITRATE 50 UG/ML
100 INJECTION, SOLUTION INTRAMUSCULAR; INTRAVENOUS ONCE
Status: DISCONTINUED | OUTPATIENT
Start: 2024-07-11 | End: 2024-07-12 | Stop reason: HOSPADM

## 2024-07-11 RX ORDER — BUPIVACAINE HYDROCHLORIDE 5 MG/ML
INJECTION, SOLUTION EPIDURAL; INTRACAUDAL AS NEEDED
Status: COMPLETED | OUTPATIENT
Start: 2024-07-11 | End: 2024-07-11

## 2024-07-11 RX ORDER — SODIUM CHLORIDE, SODIUM LACTATE, POTASSIUM CHLORIDE, CALCIUM CHLORIDE 600; 310; 30; 20 MG/100ML; MG/100ML; MG/100ML; MG/100ML
20 INJECTION, SOLUTION INTRAVENOUS CONTINUOUS
Status: DISCONTINUED | OUTPATIENT
Start: 2024-07-11 | End: 2024-07-12 | Stop reason: HOSPADM

## 2024-07-11 RX ORDER — LIDOCAINE HYDROCHLORIDE 10 MG/ML
INJECTION, SOLUTION EPIDURAL; INFILTRATION; INTRACAUDAL; PERINEURAL AS NEEDED
Status: COMPLETED | OUTPATIENT
Start: 2024-07-11 | End: 2024-07-11

## 2024-07-11 RX ORDER — SODIUM CHLORIDE, SODIUM LACTATE, POTASSIUM CHLORIDE, CALCIUM CHLORIDE 600; 310; 30; 20 MG/100ML; MG/100ML; MG/100ML; MG/100ML
100 INJECTION, SOLUTION INTRAVENOUS CONTINUOUS
Status: DISCONTINUED | OUTPATIENT
Start: 2024-07-11 | End: 2024-07-12 | Stop reason: HOSPADM

## 2024-07-11 RX ORDER — MIDAZOLAM HYDROCHLORIDE 1 MG/ML
INJECTION, SOLUTION INTRAMUSCULAR; INTRAVENOUS AS NEEDED
Status: COMPLETED | OUTPATIENT
Start: 2024-07-11 | End: 2024-07-11

## 2024-07-11 RX ORDER — BUPIVACAINE HYDROCHLORIDE 2.5 MG/ML
INJECTION, SOLUTION EPIDURAL; INFILTRATION; INTRACAUDAL AS NEEDED
Status: COMPLETED | OUTPATIENT
Start: 2024-07-11 | End: 2024-07-11

## 2024-07-11 ASSESSMENT — PAIN SCALES - GENERAL
PAINLEVEL_OUTOF10: 4
PAINLEVEL_OUTOF10: 4

## 2024-07-11 ASSESSMENT — COLUMBIA-SUICIDE SEVERITY RATING SCALE - C-SSRS
6. HAVE YOU EVER DONE ANYTHING, STARTED TO DO ANYTHING, OR PREPARED TO DO ANYTHING TO END YOUR LIFE?: NO
1. IN THE PAST MONTH, HAVE YOU WISHED YOU WERE DEAD OR WISHED YOU COULD GO TO SLEEP AND NOT WAKE UP?: NO
2. HAVE YOU ACTUALLY HAD ANY THOUGHTS OF KILLING YOURSELF?: NO

## 2024-07-11 ASSESSMENT — PAIN - FUNCTIONAL ASSESSMENT
PAIN_FUNCTIONAL_ASSESSMENT: 0-10
PAIN_FUNCTIONAL_ASSESSMENT: 0-10

## 2024-07-11 ASSESSMENT — PAIN DESCRIPTION - DESCRIPTORS
DESCRIPTORS: ACHING
DESCRIPTORS: ACHING

## 2024-07-11 NOTE — DISCHARGE INSTRUCTIONS
DISCHARGE INSTRUCTIONS FOR INJECTIONS     You underwent a celiac plexus block today    Aftermost injections, it is recommended that you relax and limit your activity for the remainder of the day unless you have been told otherwise by your pain physician.  You should not drive a car, operate machinery, or make important legal decisions unless otherwise directed by your pain physician.  You may resume your normal activity, including exercise, tomorrow.      Keep a written pain diary of how much pain relief you experienced following the injection procedure and the length of time of pain relief you experienced pain relief. Following diagnostic injections like medial branch nerve blocks, sacroiliac joint blocks, stellate ganglion injections and other blocks, it is very important you record the specific amount of pain relief you experienced immediately after the injectionand how long it lasted. Your doctor will ask you for this information at your follow up visit.     For all injections, please keep the injection site dry and inspect the site for a couple of days. You may remove the Band-Aid the day of the injection at any time.     Some discomfort, bruising or slight swelling may occur at the injection site. This is not abnormal if it occurs.  If needed you may:    -Take over the counter medication such as Tylenol or Motrin.   -Apply an ice pack for 30 minutes, 2 to 3 times a day for the first 24 hours.     You may shower today; no soaking baths, hot tubs, whirlpools or swimming pools for two days.      If you are given steroids in your injection, it may take 3-5 days for the steroid medication to take effect. You may notice a worsening of your symptoms for 1-2 days after the injection. This is not abnormal.  You may use acetaminophen, ibuprofen, or prescription medication that your doctor may have prescribed for you if you need to do so.     A few common side effects of steroids include facial flushing, sweating,  restlessness, irritability,difficulty sleeping, increase in blood sugar, and increased blood pressure. If you have diabetes, please monitor your blood sugar at least once a day for at least 5 days. If you have poorly controlled high blood pressure, monitoryour blood pressure for at least 2 days and contact your primary care physician if these numbers are unusually high for you.      If you take aspirin or non-steroidal anti-inflammatory drugs (examples are Motrin, Advil, ibuprofen, Naprosyn, Voltaren, Relafen, etc.) you may restart these this evening, but stop taking it 3 days before your next appointment, unless instructed otherwiseby your physician.      You do not need to discontinue non-aspirin-containing pain medications prior to an injection (examples: Celebrex, tramadol, hydrocodone and acetaminophen).      If you take a blood thinning medication (Coumadin, Lovenox, Fragmin,Ticlid, Plavix, Pradaxa, etc.), please discuss this with your primary care physician/cardiologist and your pain physician. These medications MUST be discontinued before you can have an injection safely, without the risk of uncontrolled bleeding. If these medications are not discontinued for an appropriate period of time, you will not be able to receivean injection.      If you are taking Coumadin, please have your INR checked the morning of your procedure and bringthe result to your appointment unless otherwise instructed. If your INR is over 1.2, your injection may need to be rescheduled to avoid uncontrolled bleeding from the needle placement.     Call [facility] at [phone] between 8am-4pm Monday - Friday if you are experiencing the following:    If you received an epidural or spinal injection:    -Headache that doesnot go away with medicine, is worse when sitting or standing up, and is greatly relieved upon lying down.   -Severe pain worse than or different than your baseline pain.   -Chills or fever (101º F or  greater).   -Drainage or signs of infection at the injection site     Go directly to the Emergency Department if you are experiencing the following and received an epidural or spinal injection:   -Abrupt weakness or progressive weakness in your legs that starts after you leave the clinic.   -Abrupt severe or worsening numbness in your legs.   -Inability to urinate after the injection or loss of bowel or bladder control without the urge to defecate or urinate.     If you have a clinical question that cannot wait until your next appointment, please call [phone] between 8am-4pm Monday - Friday or send a [employer] message. We do our best to return all non-emergency messages within 24 hours, Monday - Friday. A nurse or physician will return your message.      If you need to cancel an appointment, please call the scheduling staff at [phone] during normal business hours or leave a message at least 24 hours in advance.     If you are going to be sedated for your next procedure, you MUST have responsible adult who can legally drive accompany you home. You cannot eat or drink for eight hours prior to the planned procedure if you are going to receive sedation. You may take your non-blood thinning medications with a small sip of water.

## 2024-07-11 NOTE — PERIOPERATIVE NURSING NOTE
Discharge instructions reviewed with patient and spouse.  Patient ambulates in room without difficulty, denies weakness of lower extremities denies dizziness.  Patient discharged via w/c

## 2024-07-11 NOTE — INTERVAL H&P NOTE
H&P reviewed. The patient was examined and there are no changes to the H&P. 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 Patient's pain stable and persistent from last visit.  Appropriately NPO.  No personal/family hx issues with anesthesia. Denies allergies to Latex, steroids, local anesthetics, or iodine/contrast. Denies being on blood thinners. Not diabetic.  Denies fever, chills, NS, CP, SOB, cough, N/V.    Discussed procedure risks/benefits in detail with patient. Pt meets medical necessity for procedure due to failure of conservative measures. Reviewed procedural risks including bleeding, infection, nerve damage, paralysis. Also reviewed mitigating factors such as screening for infection/blood thinner use, sterile precautions, and image-guidance when applicable. All questions answered. Pt/guardian expressed understanding and choose to proceed    Agapito Parker MD  Anesthesiologist & Interventional Pain Physician   [facility] [place]  O: [phone]  F: [phone]  10:44 AM  07/11/24

## 2024-07-11 NOTE — PERIOPERATIVE NURSING NOTE
Rec'd patient from procedure room.  IV to right ac infusing LR.  No redness no swelling at site.  Patient denies lightheadedness no dizziness.    Patient tolerating liquids and snack.  Band aid x 1 to low back dry and intact.

## 2024-07-24 ENCOUNTER — TELEMEDICINE (OUTPATIENT)
Dept: PAIN MEDICINE | Facility: CLINIC | Age: 69
End: 2024-07-24
Payer: MEDICARE

## 2024-07-24 DIAGNOSIS — I77.4 MEDIAN ARCUATE LIGAMENT SYNDROME (CMS-HCC): Primary | ICD-10-CM

## 2024-07-24 DIAGNOSIS — R10.11 RIGHT UPPER QUADRANT ABDOMINAL PAIN: ICD-10-CM

## 2024-07-24 PROCEDURE — 1159F MED LIST DOCD IN RCRD: CPT | Performed by: ANESTHESIOLOGY

## 2024-07-24 PROCEDURE — 1036F TOBACCO NON-USER: CPT | Performed by: ANESTHESIOLOGY

## 2024-07-24 PROCEDURE — 1125F AMNT PAIN NOTED PAIN PRSNT: CPT | Performed by: ANESTHESIOLOGY

## 2024-07-24 PROCEDURE — 99213 OFFICE O/P EST LOW 20 MIN: CPT | Performed by: ANESTHESIOLOGY

## 2024-07-24 ASSESSMENT — ENCOUNTER SYMPTOMS
RESPIRATORY NEGATIVE: 1
NEUROLOGICAL NEGATIVE: 1
CONSTIPATION: 0
NAUSEA: 1
EYES NEGATIVE: 1
ABDOMINAL PAIN: 1
CONSTITUTIONAL NEGATIVE: 1
BACK PAIN: 1
HEMATOLOGIC/LYMPHATIC NEGATIVE: 1
RECTAL PAIN: 0
ANAL BLEEDING: 0
ENDOCRINE NEGATIVE: 1
ABDOMINAL DISTENTION: 0
BLOOD IN STOOL: 0
PSYCHIATRIC NEGATIVE: 1
CARDIOVASCULAR NEGATIVE: 1
VOMITING: 1
DIARRHEA: 0

## 2024-07-24 ASSESSMENT — PAIN SCALES - GENERAL
PAINLEVEL: 2
PAINLEVEL_OUTOF10: 2

## 2024-07-24 ASSESSMENT — PAIN - FUNCTIONAL ASSESSMENT: PAIN_FUNCTIONAL_ASSESSMENT: 0-10

## 2024-07-24 ASSESSMENT — PATIENT HEALTH QUESTIONNAIRE - PHQ9
1. LITTLE INTEREST OR PLEASURE IN DOING THINGS: NOT AT ALL
SUM OF ALL RESPONSES TO PHQ9 QUESTIONS 1 AND 2: 0
2. FEELING DOWN, DEPRESSED OR HOPELESS: NOT AT ALL

## 2024-07-24 ASSESSMENT — PAIN DESCRIPTION - DESCRIPTORS: DESCRIPTORS: ACHING

## 2024-07-24 NOTE — PROGRESS NOTES
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    On , pt underwent CPB with improved pain from 5/10 to 1/10 for 6 hours. Was able to tolerate lunch without issue. Pain returned/worsened by dinner time. Pain has been sporadic and mild for 1 w. F/U scheduled with Dr Kumar.    Pt denies new-onset numbness, weakness, bowel/bladder incontinence.  Pt denies recent infection, allergy to Latex/iodine/contrast. Patient is currently taking the following blood thinner(s): N/A    Procedure log:  -CPB 24: 80% relief 6 hours  -CPB 24: unsure    REVIEW OF SYSTEMS  Review of Systems   Constitutional: Negative.    HENT: Negative.     Eyes: Negative.    Respiratory: Negative.     Cardiovascular: Negative.    Gastrointestinal:  Positive for abdominal pain, nausea and vomiting. Negative for abdominal distention, anal bleeding, blood in stool, constipation, diarrhea and rectal pain.   Endocrine: Negative.    Musculoskeletal:  Positive for back pain.   Skin: Negative.    Neurological: Negative.    Hematological: Negative.    Psychiatric/Behavioral: Negative.         PAST MEDICAL HISTORY  Past Medical History:   Diagnosis Date    Cataracts, bilateral     History of benign schwannoma     HTN (hypertension)     Hypothyroid     Median arcuate ligament syndrome (CMS-HCC)     Right upper quadrant abdominal pain      Past Surgical History:   Procedure Laterality Date     SECTION, CLASSIC      CHOLECYSTECTOMY       Family History   Problem Relation Name Age of Onset    Hypertension Mother Porsha Metcalf     Heart attack Mother Porsha Metcalf     Stroke Father Luis Metcalf        CURRENT MEDICATIONS  Current Outpatient Medications  "  Medication Sig Dispense Refill    cholecalciferol (Vitamin D3) 50 MCG (2000 UT) tablet Take 1 tablet (50 mcg) by mouth once daily.      cyanocobalamin, vitamin B-12, (VITAMIN B-12 ORAL) Take by mouth.      Linzess 145 mcg capsule 1 capsule (145 mcg).      losartan (Cozaar) 25 mg tablet Take 2 tablets (50 mg) by mouth once daily.      polyethylene glycol (Glycolax, Miralax) 17 gram packet Take 17 g by mouth once daily.      Synthroid 25 mcg tablet 1 tablet (25 mcg) once daily in the evening.       No current facility-administered medications for this visit.       ALLERGIES AND DRUG REACTIONS  Allergies   Allergen Reactions    Omeprazole Itching          OBJECTIVE  Visit Vitals  OB Status Postmenopausal   Smoking Status Never       Last Recorded Pain Score (if available):                Physical Exam  PE limited by telehalth  General: Sitting in chair, NAD  Head: NCAT  Eyes: Sclera/conjunctiva clear, EOMI, PERRL  Nose/mouth: MMM    Lungs: Good/equal chest excursion  Abdomen: Soft, ND    Ext: No cyanosis/edema  MSK: Able to move ext    Neuro: AAOx3, CN grossly nl  Psych: affect nl  Skin: no rash/lesions      REVIEW OF LABORATORY DATA  I have reviewed the following lab results:  No results found for: \"WBC\", \"RBC\", \"HGB\", \"HCT\", \"MCV\", \"MCH\", \"MCHC\", \"RDW\", \"PLT\", \"MPV\"  No results found for: \"NA\", \"K\", \"CO2\", \"BUN\", \"CALCIUM\"  No results found for: \"PROTIME\", \"PTT\", \"INR\", \"FIBRINOGEN\"      REVIEW OF RADIOLOGY   I have reviewed the following:  Radiology Studies           CT A/P 5/6/24:  1. Significant focal narrowing of the proximal celiac trunk just  distal to the origin of the suspected level of the median arcuate  ligament. Distal to the focal area of stenosis there is mild  poststenotic dilatation with otherwise normal opacification of the  vasculature. In the appropriate clinical setting findings can be seen  with median arcuate ligament syndrome.  2. The remaining vasculature demonstrates no significant " abnormality.  3. No acute nonvascular abnormalities of the abdomen and pelvis.  Chronic findings are as detailed above.      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 Velocities were decreased with inspiration and/or change to a sitting position which maybe suggesive of median arcuate ligament compression as opposed to intrinsic stenosis. The patient was NPO for this study. Turbulent flow noted in the distal celiac artery, hepatic, and splenic arteries.        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    1) Abdominal pain  -Since 3/2023 on hx R retroperotineal schwannoma resection 2013 with pain worst at RUQ and exacerbated by meals likely 2/2 MALS undergoing active work-up for surgery. 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 Telehealth time: 0848; total encounter time: 22 min.    Virtual or Telephone Consent    An interactive audio and video telecommunication system which permits real time communications between the patient (at the originating site) and provider (at the distant site) was utilized to provide this telehealth service.   Verbal  consent was requested and obtained from Bonnie Robledo on this date, 07/24/24 for a telehealth visit.                Agapito Parker MD  Anesthesiologist & Interventional Pain Physician   [facility] [place]  O: [phone]  F: [phone]  9:03 AM  07/24/24

## 2024-07-24 NOTE — LETTER
July 24, 2024     Nicolle Kumar MD MPH  [address]  Bariatric Lab  [facility] [address]    Patient: Bonnie Robledo   YOB: 1955   Date of Visit: 7/24/2024       Dear Dr. Nicolle Kumar MD MPH:    Thank you for referring Bonnie Robledo to me for evaluation. Below are my notes for this consultation detailing CPB 7/11/24: 80% relief 6 hours. If you have questions, please do not hesitate to call me. I look forward to following your patient along with you.       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    On 7/11, pt underwent CPB with improved pain from 5/10 to 1/10 for 6 hours. Was able to tolerate lunch without issue. Pain returned/worsened by dinner time. Pain has been sporadic and mild for 1 w. F/U scheduled with Dr Kumar.    Pt denies new-onset numbness, weakness, bowel/bladder incontinence.  Pt denies recent infection, allergy to Latex/iodine/contrast. Patient is currently taking the following blood thinner(s): N/A    Procedure log:  -CPB 7/11/24: 80% relief 6 hours  -CPB 6/6/24: unsure    REVIEW OF SYSTEMS  Review of Systems   Constitutional: Negative.    HENT: Negative.     Eyes: Negative.    Respiratory: Negative.     Cardiovascular: Negative.    Gastrointestinal:  Positive for abdominal pain, nausea and vomiting. Negative for abdominal distention, anal bleeding, blood in stool, constipation, diarrhea and rectal pain.   Endocrine: Negative.    Musculoskeletal:  Positive for back pain.   Skin: Negative.    Neurological: Negative.   "  Hematological: Negative.    Psychiatric/Behavioral: Negative.         PAST MEDICAL HISTORY  Past Medical History:   Diagnosis Date   • Cataracts, bilateral    • History of benign schwannoma    • HTN (hypertension)    • Hypothyroid    • Median arcuate ligament syndrome (CMS-HCC)    • Right upper quadrant abdominal pain      Past Surgical History:   Procedure Laterality Date   •  SECTION, CLASSIC     • CHOLECYSTECTOMY       Family History   Problem Relation Name Age of Onset   • Hypertension Mother Porsha Metcalf    • Heart attack Mother Porsha Metcalf    • Stroke Father Luis Metcalf        CURRENT MEDICATIONS  Current Outpatient Medications   Medication Sig Dispense Refill   • cholecalciferol (Vitamin D3) 50 MCG (2000 UT) tablet Take 1 tablet (50 mcg) by mouth once daily.     • cyanocobalamin, vitamin B-12, (VITAMIN B-12 ORAL) Take by mouth.     • Linzess 145 mcg capsule 1 capsule (145 mcg).     • losartan (Cozaar) 25 mg tablet Take 2 tablets (50 mg) by mouth once daily.     • polyethylene glycol (Glycolax, Miralax) 17 gram packet Take 17 g by mouth once daily.     • Synthroid 25 mcg tablet 1 tablet (25 mcg) once daily in the evening.       No current facility-administered medications for this visit.       ALLERGIES AND DRUG REACTIONS  Allergies   Allergen Reactions   • Omeprazole Itching          OBJECTIVE  Visit Vitals  OB Status Postmenopausal   Smoking Status Never       Last Recorded Pain Score (if available):                Physical Exam  PE limited by telehalth  General: Sitting in chair, NAD  Head: NCAT  Eyes: Sclera/conjunctiva clear, EOMI, PERRL  Nose/mouth: MMM    Lungs: Good/equal chest excursion  Abdomen: Soft, ND    Ext: No cyanosis/edema  MSK: Able to move ext    Neuro: AAOx3, CN grossly nl  Psych: affect nl  Skin: no rash/lesions      REVIEW OF LABORATORY DATA  I have reviewed the following lab results:  No results found for: \"WBC\", \"RBC\", \"HGB\", \"HCT\", \"MCV\", \"MCH\", \"MCHC\", \"RDW\", \"PLT\", \"MPV\"  No " "results found for: \"NA\", \"K\", \"CO2\", \"BUN\", \"CALCIUM\"  No results found for: \"PROTIME\", \"PTT\", \"INR\", \"FIBRINOGEN\"      REVIEW OF RADIOLOGY   I have reviewed the following:  Radiology Studies           CT A/P 5/6/24:  1. Significant focal narrowing of the proximal celiac trunk just  distal to the origin of the suspected level of the median arcuate  ligament. Distal to the focal area of stenosis there is mild  poststenotic dilatation with otherwise normal opacification of the  vasculature. In the appropriate clinical setting findings can be seen  with median arcuate ligament syndrome.  2. The remaining vasculature demonstrates no significant abnormality.  3. No acute nonvascular abnormalities of the abdomen and pelvis.  Chronic findings are as detailed above.      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 Velocities were decreased with inspiration and/or change to a sitting position which maybe suggesive of median arcuate ligament compression as opposed to intrinsic stenosis. The patient was NPO for this study. Turbulent flow noted in the distal celiac artery, hepatic, and splenic arteries.        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    1) Abdominal pain  -Since 3/2023 on hx R retroperotineal schwannoma resection 2013 with pain worst at RUQ and exacerbated by meals likely 2/2 MALS undergoing active work-up for surgery. 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 Telehealth time: 0848; total encounter time: 22 min.    Virtual or Telephone Consent    An interactive audio and video telecommunication system which permits real time communications between the patient (at the originating site) and provider (at the distant site) was utilized to provide this telehealth service.   Verbal consent was requested and obtained from Bonnie Robledo on this date, 07/24/24 for a telehealth visit.                Agapito Parker MD  Anesthesiologist & Interventional Pain Physician   [facility] [place]  O: [phone]  F: [phone]  9:03 AM  07/24/24

## 2024-07-26 NOTE — PROGRESS NOTES
GENERAL SURGERY  POST TEST FUV    Date: 7/31/24    Name: Bonnie Robledo  MRN: [id number]    This is a 69 y.o. female presenting to review testing results.  Notes she has had ups and downs.  Since she saw me has had 2 visits with Dr. Espinal.  She said first one was inconclusive.  Second one she ate lunch and then 6 hours later had dinner and had a lot of pain.   The pain specialist felt this is a positive result.  Still having a lot of pain on different days.  She noted a new thing Sunday.  Pain on the left side under the ribs.  Otherwise pain always on right in upper quadrant.  This pain been there intermittently post Sunday.  Can last hours.  Exertion makes worse.  Going up and down steps brings on and the right side pain also worse with walking up hills.    Constipation:  still taking linzess and miralax and this is helping those symtpoms.  Moving bowels daily.   PROVIDER IMPRESSION 5/13/24: Bonnie Robledo is a 68 y.o. female with longstanding right abdominal pain that has been extensively worked up.   She has pain in RUQ and no tenderness. Feels all day. The recent work up really started with the episode last year.  She denies food fear.  She has lost 20 lbs unintentionally.    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   She had an open right sort of kocher incision for excision of retroperitoneal schwannoma and due to persistent RUQ pain, a laparoscopic cholecystectomy later that year in 2013. She continued to suffer from RUQ discomfort that improves with linzess and miralax but gets worse with diet and bending.   Most of her workup was done in [place] at [facility] - EGD last year was normal. US abdomen was normal. UGI SFT was normal. 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  She saw a surgeon at [facility] and they did not want to see her. So went to general surgery.  He looked at results and told her the SMA is not the thing.  No further  workup done.     The patient and I had an extensive discussion I reviewed her symptoms in detail.  She notes a constant discomfort in the right upper quadrant that seems to be right over her incision.  Her CT was reviewed and it seems like a retroperitoneal approach may have been used to approach her schwannoma that was close to the IVC and not involving the right adrenal.  She notes that the vomiting only happens if she is already having some pain and then something she eats exacerbates it.  She cannot any identify any trigger foods.  She does note a longstanding history of constipation which is managed with Linzess and MiraLAX.  She notes on this regimen the pain is much less then it would be otherwise.  Her workup was accelerated and the diagnosis of MALS came in the last year when she had a severe episode that awaken her at night.  Where she is tender and has the pain is in the area more of SMA syndrome then of median arcuate ligament syndrome.  She also has never had a gastric emptying study.  I will do an upper endoscopy to further evaluate for SMA syndrome and also get a gastric emptying study.  I have asked the patient to keep a food diary for me.  I am also wondering if she has some adhesions in the right upper quadrant as she has a large stool burden in her CT on that right side.  Depending on the results of the other studies at this time I am actually more inclined to do a diagnostic laparoscopy to look for adhesions to the right upper quadrant and a median arcuate ligament release.  She is scheduled for a celiac node injection later in the week and we will see how she responds to this.  Right now median arcuate ligament syndrome is low on my list as the cause of her symptoms although it is clear radiologically.  The response to the node block will be helpful in this diagnosis.  We will proceed as outlined     PLAN:  We will do and EGD  We will do a Gastric emptying study  Let's help your  "constipation-stay on linzess daily and the miralax  Take fiber daily and drink 60 oz of fluid daily      GES 5/23/24:    Show images for NM gastric emptying solid   FINDINGS:  The image series shows  anterograde transit radiotracer from stomach  to bowel. Computer quantification demonstrates gastric retention as  follows:  50 %  at 1 hr    (normal max 90%)  9 %  at 2 hr    (normal max 60%)  1 %  at 3 hr    (normal max 30%)      IMPRESSION  Normal gastric emptying study without evidence of gastroparesis.    EGD 5/31/24:   Result Text   Impression  Cobblestone and nodular mucosa in the upper third of the esophagus and middle third of the esophagus  The lower third of the esophagus appeared normal.  The stomach appeared normal.  The duodenum appeared normal.  The cardia appeared normal.   Findings  Cobblestone and nodular mucosa in the upper third of the esophagus and middle third of the esophagus;  The lower third of the esophagus appeared normal.  Regular Z-line 38 cm from the incisors  The stomach appeared normal.  The duodenum appeared normal.  The cardia appeared normal.  Recommendation    Follow up with me in clinic     After studies complete to review path  Work on the bowel regimen and constipation  Get the node injection  Follow up after studies done            FINAL DIAGNOSIS   A. STOMACH, ANTRUM, BIOPSY:   -- ANTRAL GASTRIC MUCOSA WITH CHRONIC NON SPECIFIC GASTRITIS AND FOCAL INTESTINAL METAPLASIA; NEGATIVE FOR DYSPLASIA.  -- IMMUNOSTAIN FOR HELICOBACTER PYLORI ORGANISMS IS NEGATIVE.        B. STOMACH, FUNDUS, BIOPSY:   -- OXYNTIC GASTRIC MUCOSA WITH NO SIGNIFICANT PATHOLOGIC FINDINGS.  -- NEGATIVE FOR HELICOBACTER PYLORI ORGANISMS ON H&E STAINED SECTIONS.        C. ESOPHAGUS, SPECIMEN LABELLED DISTAL AND \" SQUAMOUS TISSUE\", BIOPSY:   --  FRAGMENTS OF SQUAMOUS MUCOSA WITH FOCAL EPIDERMOID METAPLASIA, SEE NOTE     NOTE:  No evidence of increased intraepithelial eosinophils.     D. ESOPHAGUS, SPECIMEN LABELLED " "DISTAL AND \" ABNORMAL TISSUE\", BIOPSY:     --  FRAGMENTS OF  SQUAMOUS MUCOSA WITH EPIDERMOID METAPLASIA, SEE NOTE     NOTE:  Scattered increased intraepithelial eosinophils (up to 3/HPF) not diagnostic of eosinophilic esophagitis present.     Parts C and D discussed at GI consensus conference on 6/17/2024.      CT Angio Abd pelvis 5/9/24:   Show images for CT angio abdomen pelvis w and or wo IV IV contrast   FINDINGS:  VASCULATURE:      ABDOMINAL AORTA: No abdominal aortic aneurysm or dissection. Mild  abdominal aortic atherosclerosis.      ABDOMINAL AND PELVIC ARTERIES:      Celiac artery is patent at the origin. Proximal celiac artery just  distal to the origin of the straits a significant focal area of  stenosis better visualized on sagittal imaging series 503, image 105.  Distal to the focal area of stenosis there is mild post stenotic  dilatation with otherwise normal opacification of the main branching  vessels of the celiac trunk. Superior mesenteric artery is widely  patent without hemodynamically significant stenosis.      Right and left renal arteries are widely patent without  hemodynamically significant stenosis.      Inferior mesenteric artery is widely patent without hemodynamically  significant stenosis.      Bilateral common iliac arteries, external iliac and internal iliac  arteries are widely patent without hemodynamically significant  stenosis.      Bilateral common femoral arteries and partially visualized proximal  SFA and profunda arteries are widely patent without hemodynamically  significant stenosis.      NONVASCULAR STRUCTURES:      LOWER CHEST:  The visualized lung base is unremarkable. The heart is normal in size  without pericardial effusion. No pleural effusion is present.  Visualized distal esophagus appears normal.      ABDOMEN:      LIVER:  The liver is normal in size without evidence of focal liver lesions.      BILE DUCTS:  The intrahepatic and extrahepatic ducts are not dilated.   "    GALLBLADDER:  The gallbladder is decompressed.      PANCREAS:  The pancreas appears unremarkable without evidence of ductal  dilatation or masses.      SPLEEN:  The spleen is normal in size.      ADRENAL GLANDS:  Bilateral adrenal glands appear normal.      KIDNEYS AND URETERS:  The kidneys are normal in size and enhance symmetrically. Trace  fullness of the bilateral pelvicalyceal systems without evidence of  hydroureteronephrosis.      PELVIS:      BLADDER:  Within normal limits.      REPRODUCTIVE ORGANS:  No pelvic masses.      BOWEL:  The stomach is unremarkable. The small and large bowel are normal in  caliber and demonstrate no wall thickening. Normal appendix.          PERITONEUM/RETROPERITONEUM/LYMPH NODES:  Surgical clips are seen posterior to the supra and infrarenal IVC  along the right anterior vertebral bodies likely representing area of  prior surgical resection. No soft tissue lesions within the area to  suggest recurrence. Is no free or loculated fluid collection, no free  intraperitoneal air. The retroperitoneum appears normal. No enlarged  mesenteric lymph nodes.      BONES AND ABDOMINAL WALL:  No suspicious osseous lesions are identified. Mild degenerative  discogenic disease is noted in the lower thoracic and lumbar spine.  The abdominal wall soft tissues appear normal.      IMPRESSION:  1. Significant focal narrowing of the proximal celiac trunk just  distal to the origin of the suspected level of the median arcuate  ligament. Distal to the focal area of stenosis there is mild  poststenotic dilatation with otherwise normal opacification of the  vasculature. In the appropriate clinical setting findings can be seen  with median arcuate ligament syndrome.  2. The remaining vasculature demonstrates no significant abnormality.  3. No acute nonvascular abnormalities of the abdomen and pelvis.  Chronic findings are as detailed above.      PAST MEDICAL HISTORY:  Past Medical History:   Diagnosis Date     Cataracts, bilateral     History of benign schwannoma     HTN (hypertension)     Hypothyroid     Median arcuate ligament syndrome (CMS-HCC)     Right upper quadrant abdominal pain         PAST SURGICAL HISTORY:  Past Surgical History:   Procedure Laterality Date     SECTION, CLASSIC      CHOLECYSTECTOMY         FAMILY HISTORY:  Family History   Problem Relation Name Age of Onset    Hypertension Mother Porsha Metcalf     Heart attack Mother Porsha Metcalf     Stroke Father Luis Metcalf         SOCIAL HISTORY:  Social History     Tobacco Use    Smoking status: Never    Smokeless tobacco: Never   Vaping Use    Vaping status: Never Used   Substance Use Topics    Alcohol use: Yes     Alcohol/week: 6.0 standard drinks of alcohol     Types: 2 Glasses of wine, 4 Standard drinks or equivalent per week     Comment: 4 week    Drug use: Never       MEDICATIONS:  Prior to Admission Medications:    Current Outpatient Medications:     cholecalciferol (Vitamin D3) 50 MCG (2000 UT) tablet, Take 1 tablet (50 mcg) by mouth once daily., Disp: , Rfl:     cyanocobalamin, vitamin B-12, (VITAMIN B-12 ORAL), Take by mouth., Disp: , Rfl:     Linzess 145 mcg capsule, 1 capsule (145 mcg)., Disp: , Rfl:     losartan (Cozaar) 25 mg tablet, Take 2 tablets (50 mg) by mouth once daily., Disp: , Rfl:     polyethylene glycol (Glycolax, Miralax) 17 gram packet, Take 17 g by mouth once daily., Disp: , Rfl:     Synthroid 25 mcg tablet, 1 tablet (25 mcg) once daily in the evening., Disp: , Rfl:     ALLERGIES:  Allergies   Allergen Reactions    Omeprazole Itching       Off PPI for   (how long)    How bad is the heartburn? 0 = No symptoms  Heartburn when lying down? 0 = No symptoms  Heartburn when standing up? 0 = No symptoms  Heartburn after meals? 0 = No symptoms  Does heartburn change your diet? 0 = No symptoms  Does heartburn wake you from sleep? 0 = No symptoms    Do you have difficulty swallowing? 0 = No symptoms  Do you have pain with swallowing?  0 = No symptoms  If you take medication, does this affect your daily life? 0 = No symptoms    How bad is the regurgitation? 0 = No symptoms  Regurgitation when lying down? 0 = No symptoms  Regurgitation when standing up? 0 = No symptoms  Regurgitation after meals? 0 = No symptoms  Does regurgitation change your diet? 0 = No symptoms  Does regurgitation wake you from sleep? 0 = No symptoms    How satisfied are you with your present condition? Satisfied    Total score (calculated by summing the individual scores of questions 1-15): 0   Greatest possible score 75 (worst symptoms).   Lowest possible score 0 (no symptoms).    Heartburn score (calculated by summing the individual scores of questions 1-6): 0   Worst heartburn symptoms: 30.   No heartburn symptoms: 0.   Score less than or equal to 12 with each individual question not exceeding 2 indicate heartburn elimination.    Regurgitation score (calculated by summing the individual scores of questions 10-15): 0   Worst regurgitation symptoms: 30.   No regurgitation symptoms: 0.   Score less than or equal to 12 with each individual question not exceeding 2 indicate regurgitation elimination.    REVIEW OF SYSTEMS:  GENERAL: Negative for malaise, significant weight loss and fever  NECK: Negative for lumps, goiter, pain and significant neck swelling  RESPIRATORY: Negative for cough, wheezing or shortness of breath.  CARDIOVASCULAR: Negative for chest pain, leg swelling or palpitations.  GI: Negative for abdominal discomfort, blood in stools or black stools or change in bowel habits  : No history of dysuria, frequency or incontinence  MUSCULOSKELETAL: Negative for joint pain or swelling, back pain or muscle pain.  SKIN: Negative for lesions, rash, and itching.  PSYCH: Negative for sleep disturbance, mood disorder and recent psychosocial stressors.  ENDOCRINE: Negative for cold or heat intolerance, polyuria, polydipsia and goiter.    PHYSICAL EXAM:  There were no vitals  taken for this visit.  General appearance: obese  Skin: warm, no erythema or rashes  Lungs: clear to percussion and auscultation  Heart: regular rhythm and S1, S2 normal  Abdomen: soft, nt/nd  Extremities: Normal exam of the extremities. No swelling or pain.    IMPRESSION:  Bonnie Robledo is a 69 y.o. female  with longstanding right abdominal pain that has been extensively worked up.   She has pain in RUQ and no tenderness. Feels all day. The recent work up really started with the episode last year.  She denies food fear.  She has lost 20 lbs unintentionally.    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   She had an open right sort of kocher incision for excision of retroperitoneal schwannoma and due to persistent RUQ pain, a laparoscopic cholecystectomy later that year in 2013. She continued to suffer from RUQ discomfort that improves with linzess and miralax but gets worse with diet and bending.   Most of her workup was done in [place] at [facility] - EGD last year was normal. US abdomen was normal. UGI SFT was normal. 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  She saw a surgeon at [facility] and they did not want to see her. So went to general surgery.  He looked at results and told her the SMA is not the thing.  No further workup done.     The patient and I had an extensive discussion I reviewed her symptoms in detail.  She notes a constant discomfort in the right upper quadrant that seems to be right over her incision.  Her CT was reviewed and it seems like a retroperitoneal approach may have been used to approach her schwannoma that was close to the IVC and not involving the right adrenal.  She notes that the vomiting only happens if she is already having some pain and then something she eats exacerbates it.  She cannot any identify any trigger foods.  She does note a longstanding history of constipation which is managed with Linzess and  MiraLAX.  She notes on this regimen the pain is much less then it would be otherwise.  Her workup was accelerated and the diagnosis of MALS came in the last year when she had a severe episode that awaken her at night.  Where she is tender and has the pain is in the area more of SMA syndrome then of median arcuate ligament syndrome.  She also has never had a gastric emptying study.  I will do an upper endoscopy to further evaluate for SMA syndrome and also get a gastric emptying study.  I have asked the patient to keep a food diary for me.  I am also wondering if she has some adhesions in the right upper quadrant as she has a large stool burden in her CT on that right side.  Depending on the results of the other studies at this time I am actually more inclined to do a diagnostic laparoscopy to look for adhesions to the right upper quadrant and a median arcuate ligament release.  She is scheduled for a celiac node injection later in the week and we will see how she responds to this.  Right now median arcuate ligament syndrome is low on my list as the cause of her symptoms although it is clear radiologically.  The response to the node block will be helpful in this diagnosis.  We will proceed as outlined  Today she comes in for follow-up after having the node block.  She notes that the first block was inconclusive but when she had the second block she was able to tolerate lunch and felt well for several hours.  However by the time he came to dinner her pain started back up.  What is also new is now she has pain on the left side.  She notes that she has been keeping her bowels regular with the MiraLAX and the Linzess and this has been working well for her.  Her gastric emptying study was normal.  Her upper endoscopy was normal.  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  Otherwise she is doing well.  We discussed that all of this  information put together indicates that the median arcuate ligament syndrome is most likely what is going on and causing a lot of her symptoms.  We will plan for median arcuate ligament release.  We confirmed that SMA syndrome is not causing her problems.  We discussed the risks and benefits of the median arcuate release at length.  And she expressed understanding and wishes to proceed.  I will work with Dr. Farley to find a mutually agreeable time.  We also discussed at the time of the procedure we will look up in her right upper quadrant to see if there are any adhesions that are also compounding her symptoms    PLAN:  Keep the constipation controlled with the medications  We will plan for a median arcuate ligament release laparoscopically.  We will look on the right side and look for adhesions.   You will meet with anesthesia prior to the procedure.     The risks of the procedure including bleeding, infection, injury to neighboring organ, prolonged hospitalization, need for further procedure and death have been explained to the patient and Bonnie Robledo has expressed understanding and acceptance of them. Consent has been signed.        40 minutes spent with patient on face-to-face interaction, history/documentation, education, and coordination of care.    Barbi Valencia RN Assistant Nurse Manager, Care Coordinator Patient Nursing Contact  T: [phone]  F: [phone]    Raven Fitzpatrick LPN Coordinator  T: [phone] F: [phone]

## 2024-07-29 ENCOUNTER — APPOINTMENT (OUTPATIENT)
Dept: SURGERY | Facility: CLINIC | Age: 69
End: 2024-07-29
Payer: MEDICARE

## 2024-07-31 ENCOUNTER — APPOINTMENT (OUTPATIENT)
Dept: SURGERY | Facility: CLINIC | Age: 69
End: 2024-07-31
Payer: MEDICARE

## 2024-07-31 DIAGNOSIS — R10.11 RIGHT UPPER QUADRANT ABDOMINAL PAIN: ICD-10-CM

## 2024-07-31 DIAGNOSIS — I77.4 MEDIAN ARCUATE LIGAMENT SYNDROME (CMS-HCC): Primary | ICD-10-CM

## 2024-07-31 PROCEDURE — 99215 OFFICE O/P EST HI 40 MIN: CPT | Performed by: SURGERY

## 2024-07-31 PROCEDURE — 99215 OFFICE O/P EST HI 40 MIN: CPT | Mod: 95 | Performed by: SURGERY

## 2024-07-31 NOTE — PATIENT INSTRUCTIONS
PLAN:  Keep the constipation controlled with the medications  We will plan for a median arcuate ligament release laparoscopically.  We will look on the right side and look for adhesions.   You will meet with anesthesia prior to the procedure.

## 2024-07-31 NOTE — Clinical Note
We can plan for mals release for her.  Likely can do laparoscopic.  Should we look at aug 27 as a second case?

## 2024-08-14 ENCOUNTER — APPOINTMENT (OUTPATIENT)
Dept: SURGERY | Facility: CLINIC | Age: 69
End: 2024-08-14
Payer: MEDICARE

## 2024-08-15 DIAGNOSIS — I77.4 MEDIAN ARCUATE LIGAMENT SYNDROME (CMS-HCC): Primary | ICD-10-CM

## 2024-09-12 DIAGNOSIS — I77.4 MEDIAN ARCUATE LIGAMENT SYNDROME (CMS-HCC): Primary | ICD-10-CM

## 2024-09-18 ENCOUNTER — PRE-ADMISSION TESTING (OUTPATIENT)
Dept: PREADMISSION TESTING | Facility: HOSPITAL | Age: 69
End: 2024-09-18
Payer: MEDICARE

## 2024-09-18 VITALS
WEIGHT: 173.28 LBS | BODY MASS INDEX: 28.87 KG/M2 | TEMPERATURE: 96.3 F | HEIGHT: 65 IN | DIASTOLIC BLOOD PRESSURE: 84 MMHG | SYSTOLIC BLOOD PRESSURE: 122 MMHG | OXYGEN SATURATION: 96 % | HEART RATE: 82 BPM

## 2024-09-18 DIAGNOSIS — I77.4 MEDIAN ARCUATE LIGAMENT SYNDROME (CMS-HCC): ICD-10-CM

## 2024-09-18 DIAGNOSIS — E03.9 HYPOTHYROIDISM, UNSPECIFIED TYPE: ICD-10-CM

## 2024-09-18 DIAGNOSIS — R79.1 ABNORMAL COAGULATION PROFILE: ICD-10-CM

## 2024-09-18 LAB
ABO GROUP (TYPE) IN BLOOD: NORMAL
ANION GAP SERPL CALC-SCNC: 13 MMOL/L (ref 10–20)
ANTIBODY SCREEN: NORMAL
APTT PPP: 33 SECONDS (ref 27–38)
BUN SERPL-MCNC: 19 MG/DL (ref 6–23)
CALCIUM SERPL-MCNC: 9.7 MG/DL (ref 8.6–10.6)
CHLORIDE SERPL-SCNC: 107 MMOL/L (ref 98–107)
CO2 SERPL-SCNC: 24 MMOL/L (ref 21–32)
CREAT SERPL-MCNC: 0.73 MG/DL (ref 0.5–1.05)
EGFRCR SERPLBLD CKD-EPI 2021: 90 ML/MIN/1.73M*2
ERYTHROCYTE [DISTWIDTH] IN BLOOD BY AUTOMATED COUNT: 12.4 % (ref 11.5–14.5)
GLUCOSE SERPL-MCNC: 97 MG/DL (ref 74–99)
HCT VFR BLD AUTO: 42.7 % (ref 36–46)
HGB BLD-MCNC: 14.1 G/DL (ref 12–16)
INR PPP: 1 (ref 0.9–1.1)
MCH RBC QN AUTO: 29.9 PG (ref 26–34)
MCHC RBC AUTO-ENTMCNC: 33 G/DL (ref 32–36)
MCV RBC AUTO: 91 FL (ref 80–100)
NRBC BLD-RTO: 0 /100 WBCS (ref 0–0)
PLATELET # BLD AUTO: 312 X10*3/UL (ref 150–450)
POTASSIUM SERPL-SCNC: 4.4 MMOL/L (ref 3.5–5.3)
PROTHROMBIN TIME: 10.7 SECONDS (ref 9.8–12.8)
RBC # BLD AUTO: 4.72 X10*6/UL (ref 4–5.2)
RH FACTOR (ANTIGEN D): NORMAL
SODIUM SERPL-SCNC: 140 MMOL/L (ref 136–145)
T4 FREE SERPL-MCNC: 1.28 NG/DL (ref 0.78–1.48)
TSH SERPL-ACNC: 4.71 MIU/L (ref 0.44–3.98)
WBC # BLD AUTO: 6.3 X10*3/UL (ref 4.4–11.3)

## 2024-09-18 PROCEDURE — 86923 COMPATIBILITY TEST ELECTRIC: CPT

## 2024-09-18 PROCEDURE — 84443 ASSAY THYROID STIM HORMONE: CPT

## 2024-09-18 PROCEDURE — 82374 ASSAY BLOOD CARBON DIOXIDE: CPT

## 2024-09-18 PROCEDURE — 84439 ASSAY OF FREE THYROXINE: CPT

## 2024-09-18 PROCEDURE — 36415 COLL VENOUS BLD VENIPUNCTURE: CPT

## 2024-09-18 PROCEDURE — 85610 PROTHROMBIN TIME: CPT

## 2024-09-18 PROCEDURE — 86901 BLOOD TYPING SEROLOGIC RH(D): CPT

## 2024-09-18 PROCEDURE — 85027 COMPLETE CBC AUTOMATED: CPT

## 2024-09-18 PROCEDURE — 99204 OFFICE O/P NEW MOD 45 MIN: CPT | Performed by: NURSE PRACTITIONER

## 2024-09-18 PROCEDURE — 87081 CULTURE SCREEN ONLY: CPT

## 2024-09-18 RX ORDER — CHLORHEXIDINE GLUCONATE ORAL RINSE 1.2 MG/ML
SOLUTION DENTAL
Qty: 15 ML | Refills: 0 | Status: SHIPPED | OUTPATIENT
Start: 2024-09-18 | End: 2024-09-26 | Stop reason: HOSPADM

## 2024-09-18 RX ORDER — CHLORHEXIDINE GLUCONATE 40 MG/ML
SOLUTION TOPICAL
Qty: 473 ML | Refills: 0 | Status: SHIPPED | OUTPATIENT
Start: 2024-09-18 | End: 2024-09-26 | Stop reason: HOSPADM

## 2024-09-18 ASSESSMENT — DUKE ACTIVITY SCORE INDEX (DASI)
TOTAL_SCORE: 58.2
CAN YOU DO HEAVY WORK AROUND THE HOUSE LIKE SCRUBBING FLOORS OR LIFTING AND MOVING HEAVY FURNITURE: YES
CAN YOU DO YARD WORK LIKE RAKING LEAVES, WEEDING OR PUSHING A MOWER: YES
CAN YOU TAKE CARE OF YOURSELF (EAT, DRESS, BATHE, OR USE TOILET): YES
CAN YOU PARTICIPATE IN MODERATE RECREATIONAL ACTIVITIES LIKE GOLF, BOWLING, DANCING, DOUBLES TENNIS OR THROWING A BASEBALL OR FOOTBALL: YES
CAN YOU DO MODERATE WORK AROUND THE HOUSE LIKE VACUUMING, SWEEPING FLOORS OR CARRYING GROCERIES: YES
CAN YOU PARTICIPATE IN STRENOUS SPORTS LIKE SWIMMING, SINGLES TENNIS, FOOTBALL, BASKETBALL, OR SKIING: YES
DASI METS SCORE: 9.9
CAN YOU WALK A BLOCK OR TWO ON LEVEL GROUND: YES
CAN YOU CLIMB A FLIGHT OF STAIRS OR WALK UP A HILL: YES
CAN YOU HAVE SEXUAL RELATIONS: YES
CAN YOU WALK INDOORS, SUCH AS AROUND YOUR HOUSE: YES
CAN YOU DO LIGHT WORK AROUND THE HOUSE LIKE DUSTING OR WASHING DISHES: YES
CAN YOU RUN A SHORT DISTANCE: YES

## 2024-09-18 ASSESSMENT — ENCOUNTER SYMPTOMS
EYES NEGATIVE: 1
CONSTIPATION: 1
NEUROLOGICAL NEGATIVE: 1
CARDIOVASCULAR NEGATIVE: 1
ABDOMINAL PAIN: 1
NECK NEGATIVE: 1
RESPIRATORY NEGATIVE: 1
MUSCULOSKELETAL NEGATIVE: 1
CONSTITUTIONAL NEGATIVE: 1
ENDOCRINE NEGATIVE: 1

## 2024-09-18 ASSESSMENT — LIFESTYLE VARIABLES: SMOKING_STATUS: NONSMOKER

## 2024-09-18 NOTE — CPM/PAT H&P
CPM/PAT Evaluation       Name: Bonnie Robledo (Bonnie Robledo)  /Age: 1955/69 y.o.     Visit Type:   In-Person       Chief Complaint: MALS    HPI  The patient is a 69 year old female with complaints of abdominal pain and constipation. She had recent CT positive for MALS. She denies fever, chills, nausea, vomiting, chest pain, hematuria or hematochezia. She presents today for perioperative evaluation in anticipation of Release Laparoscopy Median Arcuate Ligament on 24 with Dr. Farley/Dr. Kumar.    Past Medical History:   Diagnosis Date    Cataracts, bilateral     History of benign schwannoma     HTN (hypertension)     Hypothyroid     Hypothyroidism     Median arcuate ligament syndrome (CMS-HCC)     Right upper quadrant abdominal pain        Past Surgical History:   Procedure Laterality Date     SECTION, CLASSIC      x2    CHOLECYSTECTOMY      OTHER SURGICAL HISTORY      ight retroperitoneal schwannoma s/p resection        Patient  reports that she is not currently sexually active. She reports using the following method of birth control/protection: Abstinence.    Family History   Problem Relation Name Age of Onset    Hypertension Mother Porsha Metcalf     Heart attack Mother Porsha Metcalf     Stroke Father Luis Metcalf     Heart disease Father Luis Metcalf        Allergies   Allergen Reactions    Omeprazole Itching       Prior to Admission medications    Medication Sig Start Date End Date Taking? Authorizing Provider   cholecalciferol (Vitamin D3) 50 MCG (2000 UT) tablet Take 1 tablet (50 mcg) by mouth once daily.    Historical Provider, MD   cyanocobalamin, vitamin B-12, (VITAMIN B-12 ORAL) Take by mouth.    Historical Provider, MD   Linzess 145 mcg capsule 1 capsule (145 mcg). 19   Historical Provider, MD   losartan (Cozaar) 25 mg tablet Take 2 tablets (50 mg) by mouth once daily.    Historical Provider, MD   polyethylene glycol (Glycolax, Miralax) 17 gram packet Take 17 g by mouth once daily.     "Historical Provider, MD   Synthroid 25 mcg tablet 1 tablet (25 mcg) once daily in the evening. 6/1/21   Historical Provider, MD PARDO ROS:   Constitutional:   neg    Neuro/Psych:   neg    Eyes:   neg     use of corrective lenses  Ears:   neg    Nose:   neg    Mouth:   neg    Throat:   neg    Neck:   neg    Cardio:   neg    Respiratory:   neg    Endocrine:   neg    GI:    abdominal pain   constipation  :   neg    Musculoskeletal:   neg    Hematologic:   neg    Skin:  neg        Physical Exam  Vitals reviewed.   Constitutional:       Appearance: Normal appearance.   HENT:      Head: Normocephalic and atraumatic.      Nose: Nose normal.      Mouth/Throat:      Mouth: Mucous membranes are moist.      Pharynx: Oropharynx is clear.   Eyes:      Extraocular Movements: Extraocular movements intact.      Conjunctiva/sclera: Conjunctivae normal.      Pupils: Pupils are equal, round, and reactive to light.   Cardiovascular:      Rate and Rhythm: Normal rate and regular rhythm.      Pulses: Normal pulses.      Heart sounds: Normal heart sounds.   Pulmonary:      Effort: Pulmonary effort is normal.      Breath sounds: Normal breath sounds.   Musculoskeletal:         General: Normal range of motion.      Cervical back: Normal range of motion.   Skin:     General: Skin is warm and dry.   Neurological:      General: No focal deficit present.      Mental Status: She is alert and oriented to person, place, and time.   Psychiatric:         Mood and Affect: Mood normal.         Behavior: Behavior normal.          PAT AIRWAY:   Airway:     Mallampati::  III    TM distance::  >3 FB    Neck ROM::  Full  normal        Visit Vitals  /84   Pulse 82   Temp 35.7 °C (96.3 °F) (Temporal)   Ht 1.651 m (5' 5\")   Wt 78.6 kg (173 lb 4.5 oz)   SpO2 96%   BMI 28.84 kg/m²   OB Status Postmenopausal   Smoking Status Never   BSA 1.9 m²          DASI Risk Score      Flowsheet Row Most Recent Value   DASI SCORE 58.2   METS Score (Will be " calculated only when all the questions are answered) 9.9          Caprini DVT Assessment      Flowsheet Row Most Recent Value   DVT Score 5   Current Status Major surgery planned, including arthroscopic and laproscopic (1-2 hours)   Age 60-75 years   BMI 30 or less          Modified Frailty Index      Flowsheet Row Most Recent Value   Modified Frailty Index Calculator 0          CHADS2 Stroke Risk  Current as of 9 minutes ago        N/A 3 to 100%: High Risk   2 to < 3%: Medium Risk   0 to < 2%: Low Risk     Last Change: N/A          This score determines the patient's risk of having a stroke if the patient has atrial fibrillation.        This score is not applicable to this patient. Components are not calculated.          Revised Cardiac Risk Index      Flowsheet Row Most Recent Value   Revised Cardiac Risk Calculator 1          Apfel Simplified Score      Flowsheet Row Most Recent Value   Apfel Simplified Score Calculator 3          Risk Analysis Index Results This Encounter    No data found in the last 1 encounters.       Stop Bang Score      Flowsheet Row Most Recent Value   Do you snore loudly? 0   Do you often feel tired or fatigued after your sleep? 0   Has anyone ever observed you stop breathing in your sleep? 0   Do you have or are you being treated for high blood pressure? 1   Recent BMI (Calculated) 28.3   Is BMI greater than 35 kg/m2? 0=No   Age older than 50 years old? 1=Yes   Is your neck circumference greater than 17 inches (Male) or 16 inches (Female)? 0   Gender - Male 0=No   STOP-BANG Total Score 2          Recent Results (from the past 168 hour(s))   CBC    Collection Time: 09/18/24  8:08 AM   Result Value Ref Range    WBC 6.3 4.4 - 11.3 x10*3/uL    nRBC 0.0 0.0 - 0.0 /100 WBCs    RBC 4.72 4.00 - 5.20 x10*6/uL    Hemoglobin 14.1 12.0 - 16.0 g/dL    Hematocrit 42.7 36.0 - 46.0 %    MCV 91 80 - 100 fL    MCH 29.9 26.0 - 34.0 pg    MCHC 33.0 32.0 - 36.0 g/dL    RDW 12.4 11.5 - 14.5 %    Platelets 312  150 - 450 x10*3/uL   Coagulation Screen    Collection Time: 09/18/24  8:08 AM   Result Value Ref Range    Protime 10.7 9.8 - 12.8 seconds    INR 1.0 0.9 - 1.1    aPTT 33 27 - 38 seconds   Basic Metabolic Panel    Collection Time: 09/18/24  8:08 AM   Result Value Ref Range    Glucose 97 74 - 99 mg/dL    Sodium 140 136 - 145 mmol/L    Potassium 4.4 3.5 - 5.3 mmol/L    Chloride 107 98 - 107 mmol/L    Bicarbonate 24 21 - 32 mmol/L    Anion Gap 13 10 - 20 mmol/L    Urea Nitrogen 19 6 - 23 mg/dL    Creatinine 0.73 0.50 - 1.05 mg/dL    eGFR 90 >60 mL/min/1.73m*2    Calcium 9.7 8.6 - 10.6 mg/dL   Type And Screen    Collection Time: 09/18/24  8:08 AM   Result Value Ref Range    ABO TYPE A     Rh TYPE POS     ANTIBODY SCREEN NEG    TSH with reflex to Free T4 if abnormal    Collection Time: 09/18/24  8:08 AM   Result Value Ref Range    Thyroid Stimulating Hormone 4.71 (H) 0.44 - 3.98 mIU/L   Thyroxine, Free    Collection Time: 09/18/24  8:08 AM   Result Value Ref Range    Thyroxine, Free 1.28 0.78 - 1.48 ng/dL   MRSA pending    Diagnostic Results    VASC US MESENTERIC ARTERY DUPLEX COMPLETE 3/7/24  CONCLUSIONS:  Mesenteric: SMA demonstrates no evidence of hemodynamically significant stenosis and 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 Velocities were decreased with inspiration and/or change to a sitting position which maybe suggesive of median arcuate ligament compression as opposed to intrinsic stenosis. The patient was NPO for this study. Turbulent flow noted in the distal celiac artery, hepatic, and splenic arteries.    EKG 2/26/24  Normal sinus rhythm  Normal ECG  No previous ECGs available     Assessment and Plan:     Anesthesia:  The patient denies problems with anesthesia in the past such as PONV, prolonged sedation, awareness, dental damage, aspiration, cardiac arrest, difficult intubation, or unexpected hospital admissions.     Cardiovascular  The patient has hypertension  managed on losartan-instructed to hold 24 hours prior to surgery, MALS scheduled for surgery with Dr. Farley/Dr. Kumar on 9/24/24.  She is scheduled for non-cardiac surgery associated with elevated risk. The patient has no major cardiac contraindications to non- cardiac surgery.  RCRI  The patient meets 0-1 RCRI criteria and therefore has a less than 1% risk of major adverse cardiac complications.  METS  The patient's functional capacity capacity is greater than 4 METS.  The patient has a 30-day risk for MACE of 1 predictor, 6.0% risk for cardiac death, nonfatal myocardial infarction, and nonfatal cardiac arrest.  ELISSA score which indicates a 0.2% risk of intraoperative or 30-day postoperative MACE (major adverse cardiac event).  Cardiology Evaluation  The patient is not followed by cardiology.    Pulmonary   No significant findings on chart review or clinical presentation and evaluation. The patient is at increased risk of perioperative pulmonary complications secondary to upper abdominal surgery, advanced age greater than 60.    The patient has a stop bang score of 2, which places patient at low risk for having ARYA.    ARISCAT 18, low, 1.6% risk of in-hospital postoperative pulmonary complications  PRODIGY 11, intermediate risk of respiratory depression episode. Patient given PI sheet for preoperative deep breathing exercises.    HEENT/Airway  No diagnoses, significant findings on chart review, clinical presentation, or evaluation. No documented or reported history of airway difficulty.     Endocrine  The patient has hypothyroidism managed on Levothyroxine. TSH obtained.    Gastrointestinal  The patient has history of right retroperitoneal schwannoma s/p resection 2013.     Eat 10- 0,  self-perceived oropharyngeal dysphagia scale (0-40)     Renal  The patient has no known history of chronic kidney disease. No renal diagnoses or significant findings on chart review or clinical presentation and evaluation. The  patient has specific risk factors associated with increased risk of perioperative renal complications related to age greater than 55, hypertension. Preventative measures include preoperative hydration.    Hematology  No diagnoses or significant findings on chart review or clinical presentation and evaluation.    Caprini score 5, intermediate risk of perioperative VTE.     Patient instructed to ambulate as soon as possible postoperatively to decrease thromboembolic risk. Initiate mechanical DVT prophylaxis as soon as possible and initiate chemical prophylaxis when deemed safe from a bleeding standpoint post surgery.     Transfusion Evaluation  A type and screen was obtained given the likelihood for perioperative transfusion of blood or blood products.    Neuro:   The patient has no neurological diagnoses or significant findings on chart review, clinical presentation, and evaluation. No grossly apparent neurological perioperative risk. The patient is at increased risk for postoperative delirium secondary to age 65 or older. The patient is at increased risk for perioperative stroke secondary to hypertension , increased age, female gender, general anesthesia. Handouts for preoperative brain exercises given to patient.    Musculoskeletal  No diagnoses or significant findings on chart review or clinical presentation and evaluation.    Genitourinary  No diagnoses or significant findings on chart review or clinical presentation and evaluation.    ID  No diagnoses or significant findings on chart review or clinical presentation and evaluation. MRSA screening obtained. Prescriptions and instructions given for Hibiclens and Peridex.    -Preoperative medication instructions were provided and reviewed with the patient.  Any additional testing or evaluation was explained to the patient.  NPO Instructions were discussed, and the patient's questions were answered prior to conclusion of this encounter. Patient verbalized  understanding of preoperative instructions. After Visit Summary given.

## 2024-09-18 NOTE — PREPROCEDURE INSTRUCTIONS
Fasting Guidelines    Why must I stop eating and drinking near surgery time?  With sedation, food or liquid in your stomach can enter your lungs causing serious complications  Increases nausea and vomiting    When do I need to stop eating and drinking before my surgery?  Do not eat any food or drink any liquids after midnight the night before your surgery/procedure.  You may have sips of water to take medications.    Additional Instructions:     We have sent a prescription for Hibiclens soap and Peridex mouth wash to your preferred pharmacy.  If you have not already, Please  your prescription and start using as directed before surgery.  Follow the instruction sheet provided to you at your CPM/PAT appointment.    Avoid herbal supplements, multivitamins and NSAIDS (non-steroidal anti-inflammatory drugs) such as Advil, Aleve, Ibuprofen, Naproxen, Excedrin, Meloxicam or Celebrex for at least 7 days prior to surgery. May take Tylenol as needed.    Avoid tobacco and alcohol products for 24 hours prior to surgery.    CONTACT SURGEON'S OFFICE IF YOU DEVELOP:  * Fever = 100.4 F   * New respiratory symptoms (e.g. cough, shortness of breath, respiratory distress, sore throat)  * Recent loss of taste or smell  *Flu like symptoms such as headache, fatigue or gastrointestinal symptoms  * You develop any open sores, shingles, burning or painful urination   AND/OR:  * You no longer wish to have the surgery.  * Any other personal circumstances change that may lead to the need to cancel or defer this surgery.  *You were admitted to any hospital within one week of your planned procedure.    Seven/Six Days before Surgery:  Review your medication instructions, stop indicated medications    Day of Surgery:  Review your medication instructions, take indicated medications  Wear comfortable loose fitting clothing  Do not use moisturizers, creams, lotions or perfume  All jewelry and valuables should be left at home    Deborah  [facility]  [facility]  Fqlue-[phone]  Owl-[phone]  Email-[email or web address]      Patient Information: Pre-Operative Infection Prevention Measures     Why did I have my nose, under my arms, and groin swabbed?  The purpose of the swab is to identify Staphylococcus aureus inside your nose or on your skin.  The swab was sent to the laboratory for culture.  A positive swab/culture for Staphylococcus aureus is called colonization or carriage.      What is Staphylococcus aureus?  Staphylococcus aureus, also known as “staph”, is a germ found on the skin or in the nose of healthy people.  Sometimes Staphylococcus aureus can get into the body and cause an infection.  This can be minor (such as pimples, boils, or other skin problems).  It might also be serious (such as a blood infection, pneumonia, or a surgical site infection).    What is Staphylococcus aureus colonization or carriage?  Colonization or carriage means that a person has the germ but is not sick from it.  These bacteria can be spread on the hands or when breathing or sneezing.    How is Staphylococcus aureus spread?  It is most often spread by close contact with a person or item that carries it.    What happens if my culture is positive for Staphylococcus aureus?  Your doctor/medical team will use this information to guide any antibiotic treatment which may be necessary.  Regardless of the culture results, we will clean the inside of your nose with a betadine swab just before you have your surgery.      Will I get an infection if I have Staphylococcus aureus in my nose or on my skin?  Anyone can get an infection with Staphylococcus aureus.  However, the best way to reduce your risk of infection is to follow the instructions provided to you for the use of your CHG soap and dental rinse.        Patient Information: Oral/Dental Rinse    What is oral/dental rinse?   It is a mouthwash. It is a way of cleaning the  mouth with a germ-killing solution before your surgery.  The solution contains chlorhexidine, commonly known as CHG.   It is used inside the mouth to kill a bacteria known as Staphylococcus aureus.  Let your doctor know if you are allergic to Chlorhexidine.    Why do I need to use CHG oral/dental rinse?  The CHG oral/dental rinse helps to kill a bacteria in your mouth known as Staphylococcus aureus.     This reduces the risk of infection at the surgical site.      Using your CHG oral/dental rinse  STEPS:  Use your CHG oral/dental rinse after you brush your teeth the night before (at bedtime) and the morning of your surgery.  Follow all directions on your prescription label.    Use the cap on the container to measure 15ml   Swish (gargle if you can) the mouthwash in your mouth for at least 30 seconds, (do not swallow) and spit out  After you use your CHG rinse, do not rinse your mouth with water, drink or eat.  Please refer to the prescription label for the appropriate time to resume oral intake      What side effects might I have using the CHG oral/dental rinse?  CHG rinse will stick to plaque on the teeth.  Brush and floss just before use.  Teeth brushing will help avoid staining of plaque during use.      Patient Information: Home Preoperative Antibacterial Shower      What is a home preoperative antibacterial shower?  This shower is a way of cleaning the skin with a germ-killing solution before surgery.  The solution contains chlorhexidine, commonly known as CHG.  CHG is a skin cleanser with germ-killing ability.  Let your doctor know if you are allergic to chlorhexidine.    Why do I need to take a preoperative antibacterial shower?  Skin is not sterile.  It is best to try to make your skin as free of germs as possible before surgery.  Proper cleansing with a germ-killing soap before surgery can lower the number of germs on your skin.  This helps to reduce the risk of infection at the surgical site.  Following  the instructions listed below will help you prepare your skin for surgery.      How do I use the solution?  Steps:  Begin using your CHG soap 5 days before your scheduled surgery on ________________________.    First, wash and rinse your hair using the CHG soap. Keep CHG soap away from ear canals and eyes.  Rinse completely, do not condition.  Hair extensions should be removed.  Wash your face with your normal soap and rinse.    Apply the CHG solution to a clean wet washcloth.  Turn the water off or move away from the water spray to avoid premature rinsing of the CHG soap as you are applying.   Firmly lather your entire body from the neck down.  Do not use on your face.  Pay special attention to the area(s) where your incision(s) will be located unless they are on your face.  Avoid scrubbing your skin too hard.  The important point is to have the CHG soap sit on your skin for 3 minutes.    When the 3 minutes are up, turn on the water and rinse the CHG solution off your body completely.   DO NOT wash with regular soap after you have used the CHG soap solution  Pat yourself dry with a clean, freshly-laundered towel.  DO NOT apply powders, deodorants, or lotions.  Dress in clean, freshly laundered nightclothes.    Be sure to sleep with clean, freshly laundered sheets.  Be aware that CHG will cause stains on fabrics; if you wash them with bleach after use.  Rinse your washcloth and other linens that have contact with CHG completely.  Use only non-chlorine detergents to launder the items used.   The morning of surgery is the fifth day.  Repeat the above steps and dress in clean comfortable clothing     Whom should I contact if I have any questions regarding the use of CHG soap?  Call the [facility], [facility] at [phone] if you have any questions.               Preoperative Brain Exercises    What are brain exercises?  A brain exercise is any activity that  engages your thinking (cognitive) skills.    What types of activities are considered brain exercises?  Jigsaw puzzles, crossword puzzles, word jumble, memory games, word search, and many more.  Many can be found free online or on your phone via a mobile nancy.    Why should I do brain exercises before my surgery?  More recent research has shown brain exercise before surgery can lower the risk of postoperative delirium (confusion) which can be especially important for older adults.  Patients who did brain exercises for 5 to 10 hours the days before surgery, cut their risk of postoperative delirium in half up to 1 week after surgery.         [facility]    Preoperative Deep Breathing Exercises    Why it is important to do deep breathing exercises before my surgery?  Deep breathing exercises strengthen your breathing muscles.  This helps you to recover after your surgery and decreases the chance of breathing complications.      How are the deep breathing exercises done?  Sit straight with your back supported.  Breathe in deeply and slowly through your nose. Your lower rib cage should expand and your abdomen may move forward.  Hold that breath for 3 to 5 seconds.  Breathe out through pursed lips, slowly and completely.  Rest and repeat 10 times every hour while awake.  Rest longer if you become dizzy or lightheaded.         Patient and Family Education             Ways You Can Help Prevent Blood Clots             This handout explains some simple things you can do to help prevent blood clots.      Blood clots are blockages that can form in the body's veins. When a blood clot forms in your deep veins, it may be called a deep vein thrombosis, or DVT for short. Blood clots can happen in any part of the body where blood flows, but they are most common in the arms and legs. If a piece of a blood clot breaks free and travels to the lungs, it is called a pulmonary embolus (PE). A PE can be a very  serious problem.         Being in the hospital or having surgery can raise your chances of getting a blood clot because you may not be well enough to move around as much as you normally do.         Ways you can help prevent blood clots in the hospital         Wearing SCDs. SCDs stands for Sequential Compression Devices.   SCDs are special sleeves that wrap around your legs  They attach to a pump that fills them with air to gently squeeze your legs every few minutes.   This helps return the blood in your legs to your heart.   SCDs should only be taken off when walking or bathing.   SCDs may not be comfortable, but they can help save your life.               Wearing compression stockings - if your doctor orders them. These special snug fitting stockings gently squeeze your legs to help blood flow.       Walking. Walking helps move the blood in your legs.   If your doctor says it is ok, try walking the halls at least   5 times a day. Ask us to help you get up, so you don't fall.      Taking any blood thinning medicines your doctor orders.        Page 1 of 2     [facility]; 3/23   Ways you can help prevent blood clots at home       Wearing compression stockings - if your doctor orders them. ? Walking - to help move the blood in your legs.       Taking any blood thinning medicines your doctor orders.      Signs of a blood clot or PE      Tell your doctor or nurse know right away if you have of the problems listed below.    If you are at home, seek medical care right away. Call 911 for chest pain or problems breathing.               Signs of a blood clot (DVT) - such as pain,  swelling, redness or warmth in your arm or leg      Signs of a pulmonary embolism (PE) - such as chest     pain or feeling short of breath

## 2024-09-19 LAB — STAPHYLOCOCCUS SPEC CULT: NORMAL

## 2024-09-23 ENCOUNTER — ANESTHESIA EVENT (OUTPATIENT)
Dept: OPERATING ROOM | Facility: HOSPITAL | Age: 69
End: 2024-09-23
Payer: MEDICARE

## 2024-09-24 ENCOUNTER — ANESTHESIA (OUTPATIENT)
Dept: OPERATING ROOM | Facility: HOSPITAL | Age: 69
End: 2024-09-24
Payer: MEDICARE

## 2024-09-24 ENCOUNTER — HOSPITAL ENCOUNTER (OUTPATIENT)
Facility: HOSPITAL | Age: 69
LOS: 1 days | Discharge: HOME | End: 2024-09-26
Attending: SURGERY | Admitting: SURGERY
Payer: MEDICARE

## 2024-09-24 DIAGNOSIS — I77.4 MEDIAN ARCUATE LIGAMENT SYNDROME (CMS-HCC): Primary | ICD-10-CM

## 2024-09-24 DIAGNOSIS — K44.9 HIATAL HERNIA: ICD-10-CM

## 2024-09-24 DIAGNOSIS — R42 POSTURAL DIZZINESS WITH NEAR SYNCOPE: ICD-10-CM

## 2024-09-24 DIAGNOSIS — R55 POSTURAL DIZZINESS WITH NEAR SYNCOPE: ICD-10-CM

## 2024-09-24 DIAGNOSIS — R52 ACUTE PAIN: ICD-10-CM

## 2024-09-24 PROBLEM — I10 HTN (HYPERTENSION): Status: ACTIVE | Noted: 2024-09-24

## 2024-09-24 PROBLEM — E03.9 HYPOTHYROIDISM: Status: ACTIVE | Noted: 2024-09-24

## 2024-09-24 LAB
ABO GROUP (TYPE) IN BLOOD: NORMAL
ANION GAP BLDA CALCULATED.4IONS-SCNC: 11 MMO/L (ref 10–25)
BASE EXCESS BLDA CALC-SCNC: -1.8 MMOL/L (ref -2–3)
BODY TEMPERATURE: 37 DEGREES CELSIUS
CA-I BLDA-SCNC: 1.24 MMOL/L (ref 1.1–1.33)
CHLORIDE BLDA-SCNC: 106 MMOL/L (ref 98–107)
GLUCOSE BLDA-MCNC: 113 MG/DL (ref 74–99)
HCO3 BLDA-SCNC: 22.1 MMOL/L (ref 22–26)
HCT VFR BLD EST: 38 % (ref 36–46)
HGB BLDA-MCNC: 12.6 G/DL (ref 12–16)
INHALED O2 CONCENTRATION: 62 %
LACTATE BLDA-SCNC: 0.9 MMOL/L (ref 0.4–2)
OXYHGB MFR BLDA: 98.3 % (ref 94–98)
PCO2 BLDA: 34 MM HG (ref 38–42)
PH BLDA: 7.42 PH (ref 7.38–7.42)
PO2 BLDA: 271 MM HG (ref 85–95)
POTASSIUM BLDA-SCNC: 3.7 MMOL/L (ref 3.5–5.3)
RH FACTOR (ANTIGEN D): NORMAL
SAO2 % BLDA: 100 % (ref 94–100)
SODIUM BLDA-SCNC: 135 MMOL/L (ref 136–145)

## 2024-09-24 PROCEDURE — 3700000001 HC GENERAL ANESTHESIA TIME - INITIAL BASE CHARGE: Performed by: SURGERY

## 2024-09-24 PROCEDURE — 84132 ASSAY OF SERUM POTASSIUM: CPT

## 2024-09-24 PROCEDURE — 7100000011 HC EXTENDED STAY RECOVERY HOURLY - NURSING UNIT

## 2024-09-24 PROCEDURE — 2500000005 HC RX 250 GENERAL PHARMACY W/O HCPCS

## 2024-09-24 PROCEDURE — 3600000004 HC OR TIME - INITIAL BASE CHARGE - PROCEDURE LEVEL FOUR: Performed by: SURGERY

## 2024-09-24 PROCEDURE — 3700000002 HC GENERAL ANESTHESIA TIME - EACH INCREMENTAL 1 MINUTE: Performed by: SURGERY

## 2024-09-24 PROCEDURE — 2500000001 HC RX 250 WO HCPCS SELF ADMINISTERED DRUGS (ALT 637 FOR MEDICARE OP)

## 2024-09-24 PROCEDURE — 2500000001 HC RX 250 WO HCPCS SELF ADMINISTERED DRUGS (ALT 637 FOR MEDICARE OP): Performed by: SURGERY

## 2024-09-24 PROCEDURE — 37799 UNLISTED PX VASCULAR SURGERY: CPT | Performed by: SURGERY

## 2024-09-24 PROCEDURE — 43281 LAP PARAESOPHAG HERN REPAIR: CPT | Performed by: SURGERY

## 2024-09-24 PROCEDURE — 3600000009 HC OR TIME - EACH INCREMENTAL 1 MINUTE - PROCEDURE LEVEL FOUR: Performed by: SURGERY

## 2024-09-24 PROCEDURE — 96372 THER/PROPH/DIAG INJ SC/IM: CPT | Performed by: SURGERY

## 2024-09-24 PROCEDURE — 7100000002 HC RECOVERY ROOM TIME - EACH INCREMENTAL 1 MINUTE: Performed by: SURGERY

## 2024-09-24 PROCEDURE — 36415 COLL VENOUS BLD VENIPUNCTURE: CPT | Performed by: ANESTHESIOLOGY

## 2024-09-24 PROCEDURE — 7100000001 HC RECOVERY ROOM TIME - INITIAL BASE CHARGE: Performed by: SURGERY

## 2024-09-24 PROCEDURE — 2500000004 HC RX 250 GENERAL PHARMACY W/ HCPCS (ALT 636 FOR OP/ED): Performed by: STUDENT IN AN ORGANIZED HEALTH CARE EDUCATION/TRAINING PROGRAM

## 2024-09-24 PROCEDURE — 2720000007 HC OR 272 NO HCPCS: Performed by: SURGERY

## 2024-09-24 PROCEDURE — 2500000004 HC RX 250 GENERAL PHARMACY W/ HCPCS (ALT 636 FOR OP/ED)

## 2024-09-24 PROCEDURE — 2780000003 HC OR 278 NO HCPCS: Performed by: SURGERY

## 2024-09-24 PROCEDURE — 2500000004 HC RX 250 GENERAL PHARMACY W/ HCPCS (ALT 636 FOR OP/ED): Performed by: SURGERY

## 2024-09-24 PROCEDURE — 2500000005 HC RX 250 GENERAL PHARMACY W/O HCPCS: Performed by: SURGERY

## 2024-09-24 RX ORDER — NALOXONE HYDROCHLORIDE 0.4 MG/ML
0.2 INJECTION, SOLUTION INTRAMUSCULAR; INTRAVENOUS; SUBCUTANEOUS EVERY 5 MIN PRN
Status: DISCONTINUED | OUTPATIENT
Start: 2024-09-24 | End: 2024-09-26 | Stop reason: HOSPADM

## 2024-09-24 RX ORDER — ACETAMINOPHEN 325 MG/1
650 TABLET ORAL EVERY 4 HOURS PRN
Status: DISCONTINUED | OUTPATIENT
Start: 2024-09-24 | End: 2024-09-26 | Stop reason: HOSPADM

## 2024-09-24 RX ORDER — FENTANYL CITRATE 50 UG/ML
INJECTION, SOLUTION INTRAMUSCULAR; INTRAVENOUS AS NEEDED
Status: DISCONTINUED | OUTPATIENT
Start: 2024-09-24 | End: 2024-09-24

## 2024-09-24 RX ORDER — ALBUTEROL SULFATE 0.83 MG/ML
2.5 SOLUTION RESPIRATORY (INHALATION) ONCE AS NEEDED
Status: DISCONTINUED | OUTPATIENT
Start: 2024-09-24 | End: 2024-09-24 | Stop reason: HOSPADM

## 2024-09-24 RX ORDER — MIDAZOLAM HYDROCHLORIDE 1 MG/ML
INJECTION INTRAMUSCULAR; INTRAVENOUS AS NEEDED
Status: DISCONTINUED | OUTPATIENT
Start: 2024-09-24 | End: 2024-09-24

## 2024-09-24 RX ORDER — ONDANSETRON 4 MG/1
4 TABLET, FILM COATED ORAL EVERY 8 HOURS PRN
Status: DISCONTINUED | OUTPATIENT
Start: 2024-09-24 | End: 2024-09-26 | Stop reason: HOSPADM

## 2024-09-24 RX ORDER — ACETAMINOPHEN 325 MG/1
650 TABLET ORAL EVERY 4 HOURS PRN
Status: DISCONTINUED | OUTPATIENT
Start: 2024-09-24 | End: 2024-09-24 | Stop reason: HOSPADM

## 2024-09-24 RX ORDER — ONDANSETRON HYDROCHLORIDE 2 MG/ML
4 INJECTION, SOLUTION INTRAVENOUS EVERY 8 HOURS PRN
Status: DISCONTINUED | OUTPATIENT
Start: 2024-09-24 | End: 2024-09-26 | Stop reason: HOSPADM

## 2024-09-24 RX ORDER — LIDOCAINE HYDROCHLORIDE 10 MG/ML
INJECTION, SOLUTION INFILTRATION; PERINEURAL AS NEEDED
Status: DISCONTINUED | OUTPATIENT
Start: 2024-09-24 | End: 2024-09-24

## 2024-09-24 RX ORDER — NORETHINDRONE AND ETHINYL ESTRADIOL 0.5-0.035
KIT ORAL AS NEEDED
Status: DISCONTINUED | OUTPATIENT
Start: 2024-09-24 | End: 2024-09-24

## 2024-09-24 RX ORDER — ROCURONIUM BROMIDE 10 MG/ML
INJECTION, SOLUTION INTRAVENOUS AS NEEDED
Status: DISCONTINUED | OUTPATIENT
Start: 2024-09-24 | End: 2024-09-24

## 2024-09-24 RX ORDER — ONDANSETRON HYDROCHLORIDE 2 MG/ML
4 INJECTION, SOLUTION INTRAVENOUS ONCE AS NEEDED
Status: DISCONTINUED | OUTPATIENT
Start: 2024-09-24 | End: 2024-09-24 | Stop reason: HOSPADM

## 2024-09-24 RX ORDER — HYDROMORPHONE HYDROCHLORIDE 1 MG/ML
0.5 INJECTION, SOLUTION INTRAMUSCULAR; INTRAVENOUS; SUBCUTANEOUS EVERY 5 MIN PRN
Status: DISCONTINUED | OUTPATIENT
Start: 2024-09-24 | End: 2024-09-24 | Stop reason: HOSPADM

## 2024-09-24 RX ORDER — HEPARIN SODIUM 5000 [USP'U]/ML
5000 INJECTION, SOLUTION INTRAVENOUS; SUBCUTANEOUS EVERY 8 HOURS
Status: DISCONTINUED | OUTPATIENT
Start: 2024-09-24 | End: 2024-09-25

## 2024-09-24 RX ORDER — HYDRALAZINE HYDROCHLORIDE 20 MG/ML
5 INJECTION INTRAMUSCULAR; INTRAVENOUS EVERY 30 MIN PRN
Status: DISCONTINUED | OUTPATIENT
Start: 2024-09-24 | End: 2024-09-24 | Stop reason: HOSPADM

## 2024-09-24 RX ORDER — PROPOFOL 10 MG/ML
INJECTION, EMULSION INTRAVENOUS AS NEEDED
Status: DISCONTINUED | OUTPATIENT
Start: 2024-09-24 | End: 2024-09-24

## 2024-09-24 RX ORDER — PHENYLEPHRINE HCL IN 0.9% NACL 0.4MG/10ML
SYRINGE (ML) INTRAVENOUS AS NEEDED
Status: DISCONTINUED | OUTPATIENT
Start: 2024-09-24 | End: 2024-09-24

## 2024-09-24 RX ORDER — CEFAZOLIN 1 G/1
INJECTION, POWDER, FOR SOLUTION INTRAVENOUS AS NEEDED
Status: DISCONTINUED | OUTPATIENT
Start: 2024-09-24 | End: 2024-09-24

## 2024-09-24 RX ORDER — BISACODYL 5 MG
10 TABLET, DELAYED RELEASE (ENTERIC COATED) ORAL DAILY PRN
Status: DISCONTINUED | OUTPATIENT
Start: 2024-09-24 | End: 2024-09-26 | Stop reason: HOSPADM

## 2024-09-24 RX ORDER — GABAPENTIN 300 MG/1
300 CAPSULE ORAL 3 TIMES DAILY
Status: DISCONTINUED | OUTPATIENT
Start: 2024-09-24 | End: 2024-09-26 | Stop reason: HOSPADM

## 2024-09-24 RX ORDER — SODIUM CHLORIDE, SODIUM LACTATE, POTASSIUM CHLORIDE, CALCIUM CHLORIDE 600; 310; 30; 20 MG/100ML; MG/100ML; MG/100ML; MG/100ML
100 INJECTION, SOLUTION INTRAVENOUS CONTINUOUS
Status: DISCONTINUED | OUTPATIENT
Start: 2024-09-24 | End: 2024-09-24 | Stop reason: HOSPADM

## 2024-09-24 RX ORDER — PANTOPRAZOLE SODIUM 40 MG/10ML
40 INJECTION, POWDER, LYOPHILIZED, FOR SOLUTION INTRAVENOUS
Status: DISCONTINUED | OUTPATIENT
Start: 2024-09-25 | End: 2024-09-26 | Stop reason: HOSPADM

## 2024-09-24 RX ORDER — DEXTROMETHORPHAN/PSEUDOEPHED 2.5-7.5/.8
40 DROPS ORAL 4 TIMES DAILY PRN
Status: DISCONTINUED | OUTPATIENT
Start: 2024-09-24 | End: 2024-09-26 | Stop reason: HOSPADM

## 2024-09-24 RX ORDER — SENNOSIDES 8.8 MG/5ML
10 LIQUID ORAL 2 TIMES DAILY
Status: DISCONTINUED | OUTPATIENT
Start: 2024-09-24 | End: 2024-09-24

## 2024-09-24 RX ORDER — SODIUM CHLORIDE, SODIUM LACTATE, POTASSIUM CHLORIDE, CALCIUM CHLORIDE 600; 310; 30; 20 MG/100ML; MG/100ML; MG/100ML; MG/100ML
100 INJECTION, SOLUTION INTRAVENOUS CONTINUOUS
Status: DISCONTINUED | OUTPATIENT
Start: 2024-09-24 | End: 2024-09-25

## 2024-09-24 RX ORDER — OXYCODONE HYDROCHLORIDE 5 MG/1
10 TABLET ORAL EVERY 4 HOURS PRN
Status: DISCONTINUED | OUTPATIENT
Start: 2024-09-24 | End: 2024-09-24 | Stop reason: HOSPADM

## 2024-09-24 RX ORDER — LIDOCAINE HYDROCHLORIDE 10 MG/ML
0.1 INJECTION, SOLUTION INFILTRATION; PERINEURAL ONCE
Status: DISCONTINUED | OUTPATIENT
Start: 2024-09-24 | End: 2024-09-24 | Stop reason: HOSPADM

## 2024-09-24 RX ORDER — OXYCODONE HYDROCHLORIDE 5 MG/1
5 TABLET ORAL EVERY 4 HOURS PRN
Status: DISCONTINUED | OUTPATIENT
Start: 2024-09-24 | End: 2024-09-24 | Stop reason: HOSPADM

## 2024-09-24 RX ORDER — HYDROMORPHONE HYDROCHLORIDE 1 MG/ML
INJECTION, SOLUTION INTRAMUSCULAR; INTRAVENOUS; SUBCUTANEOUS AS NEEDED
Status: DISCONTINUED | OUTPATIENT
Start: 2024-09-24 | End: 2024-09-24

## 2024-09-24 RX ORDER — METHOCARBAMOL 100 MG/ML
1000 INJECTION, SOLUTION INTRAMUSCULAR; INTRAVENOUS ONCE
Status: COMPLETED | OUTPATIENT
Start: 2024-09-24 | End: 2024-09-24

## 2024-09-24 RX ORDER — OXYCODONE HYDROCHLORIDE 5 MG/1
5 TABLET ORAL EVERY 6 HOURS PRN
Status: DISCONTINUED | OUTPATIENT
Start: 2024-09-24 | End: 2024-09-26 | Stop reason: HOSPADM

## 2024-09-24 RX ORDER — LABETALOL HYDROCHLORIDE 5 MG/ML
5 INJECTION, SOLUTION INTRAVENOUS ONCE AS NEEDED
Status: DISCONTINUED | OUTPATIENT
Start: 2024-09-24 | End: 2024-09-24 | Stop reason: HOSPADM

## 2024-09-24 RX ORDER — HYDROMORPHONE HYDROCHLORIDE 1 MG/ML
0.2 INJECTION, SOLUTION INTRAMUSCULAR; INTRAVENOUS; SUBCUTANEOUS EVERY 5 MIN PRN
Status: DISCONTINUED | OUTPATIENT
Start: 2024-09-24 | End: 2024-09-24 | Stop reason: HOSPADM

## 2024-09-24 RX ORDER — NITROGLYCERIN 40 MG/100ML
INJECTION INTRAVENOUS AS NEEDED
Status: DISCONTINUED | OUTPATIENT
Start: 2024-09-24 | End: 2024-09-24

## 2024-09-24 RX ORDER — SODIUM CHLORIDE 0.9 G/100ML
IRRIGANT IRRIGATION AS NEEDED
Status: DISCONTINUED | OUTPATIENT
Start: 2024-09-24 | End: 2024-09-24 | Stop reason: HOSPADM

## 2024-09-24 RX ORDER — ONDANSETRON HYDROCHLORIDE 2 MG/ML
INJECTION, SOLUTION INTRAVENOUS AS NEEDED
Status: DISCONTINUED | OUTPATIENT
Start: 2024-09-24 | End: 2024-09-24

## 2024-09-24 RX ORDER — PHENYLEPHRINE HYDROCHLORIDE 10 MG/ML
INJECTION INTRAVENOUS AS NEEDED
Status: DISCONTINUED | OUTPATIENT
Start: 2024-09-24 | End: 2024-09-24

## 2024-09-24 RX ORDER — HYDROMORPHONE HYDROCHLORIDE 1 MG/ML
0.4 INJECTION, SOLUTION INTRAMUSCULAR; INTRAVENOUS; SUBCUTANEOUS
Status: DISCONTINUED | OUTPATIENT
Start: 2024-09-24 | End: 2024-09-26 | Stop reason: HOSPADM

## 2024-09-24 RX ORDER — BUPIVACAINE HCL/EPINEPHRINE 0.5-1:200K
VIAL (ML) INJECTION AS NEEDED
Status: DISCONTINUED | OUTPATIENT
Start: 2024-09-24 | End: 2024-09-24 | Stop reason: HOSPADM

## 2024-09-24 RX ORDER — AMOXICILLIN 250 MG
1 CAPSULE ORAL 2 TIMES DAILY
Status: DISCONTINUED | OUTPATIENT
Start: 2024-09-24 | End: 2024-09-26 | Stop reason: HOSPADM

## 2024-09-24 RX ORDER — PANTOPRAZOLE SODIUM 40 MG/1
40 TABLET, DELAYED RELEASE ORAL
Status: DISCONTINUED | OUTPATIENT
Start: 2024-09-25 | End: 2024-09-26 | Stop reason: HOSPADM

## 2024-09-24 SDOH — SOCIAL STABILITY: SOCIAL INSECURITY: DOES ANYONE TRY TO KEEP YOU FROM HAVING/CONTACTING OTHER FRIENDS OR DOING THINGS OUTSIDE YOUR HOME?: NO

## 2024-09-24 SDOH — SOCIAL STABILITY: SOCIAL INSECURITY: DO YOU FEEL ANYONE HAS EXPLOITED OR TAKEN ADVANTAGE OF YOU FINANCIALLY OR OF YOUR PERSONAL PROPERTY?: NO

## 2024-09-24 SDOH — SOCIAL STABILITY: SOCIAL INSECURITY: ARE YOU OR HAVE YOU BEEN THREATENED OR ABUSED PHYSICALLY, EMOTIONALLY, OR SEXUALLY BY ANYONE?: NO

## 2024-09-24 SDOH — SOCIAL STABILITY: SOCIAL INSECURITY: ABUSE: ADULT

## 2024-09-24 SDOH — SOCIAL STABILITY: SOCIAL INSECURITY: HAS ANYONE EVER THREATENED TO HURT YOUR FAMILY OR YOUR PETS?: NO

## 2024-09-24 SDOH — SOCIAL STABILITY: SOCIAL INSECURITY: WERE YOU ABLE TO COMPLETE ALL THE BEHAVIORAL HEALTH SCREENINGS?: YES

## 2024-09-24 SDOH — SOCIAL STABILITY: SOCIAL INSECURITY: ARE THERE ANY APPARENT SIGNS OF INJURIES/BEHAVIORS THAT COULD BE RELATED TO ABUSE/NEGLECT?: NO

## 2024-09-24 SDOH — SOCIAL STABILITY: SOCIAL INSECURITY: DO YOU FEEL UNSAFE GOING BACK TO THE PLACE WHERE YOU ARE LIVING?: NO

## 2024-09-24 SDOH — SOCIAL STABILITY: SOCIAL INSECURITY: HAVE YOU HAD THOUGHTS OF HARMING ANYONE ELSE?: NO

## 2024-09-24 ASSESSMENT — COGNITIVE AND FUNCTIONAL STATUS - GENERAL
PATIENT BASELINE BEDBOUND: NO
MOBILITY SCORE: 22
MOBILITY SCORE: 24
WALKING IN HOSPITAL ROOM: A LITTLE
DAILY ACTIVITIY SCORE: 24
DAILY ACTIVITIY SCORE: 24
CLIMB 3 TO 5 STEPS WITH RAILING: A LOT
MOBILITY SCORE: 21
DAILY ACTIVITIY SCORE: 24
CLIMB 3 TO 5 STEPS WITH RAILING: A LITTLE
WALKING IN HOSPITAL ROOM: A LITTLE

## 2024-09-24 ASSESSMENT — ACTIVITIES OF DAILY LIVING (ADL)
BATHING: INDEPENDENT
FEEDING YOURSELF: INDEPENDENT
DRESSING YOURSELF: INDEPENDENT
WALKS IN HOME: INDEPENDENT
GROOMING: INDEPENDENT
JUDGMENT_ADEQUATE_SAFELY_COMPLETE_DAILY_ACTIVITIES: YES
PATIENT'S MEMORY ADEQUATE TO SAFELY COMPLETE DAILY ACTIVITIES?: YES
TOILETING: INDEPENDENT
HEARING - RIGHT EAR: FUNCTIONAL
LACK_OF_TRANSPORTATION: PATIENT DECLINED
HEARING - LEFT EAR: FUNCTIONAL
ADEQUATE_TO_COMPLETE_ADL: YES

## 2024-09-24 ASSESSMENT — PAIN SCALES - GENERAL
PAINLEVEL_OUTOF10: 10 - WORST POSSIBLE PAIN
PAINLEVEL_OUTOF10: 5 - MODERATE PAIN
PAINLEVEL_OUTOF10: 5 - MODERATE PAIN
PAINLEVEL_OUTOF10: 8
PAINLEVEL_OUTOF10: 8
PAINLEVEL_OUTOF10: 4
PAINLEVEL_OUTOF10: 4
PAINLEVEL_OUTOF10: 10 - WORST POSSIBLE PAIN
PAINLEVEL_OUTOF10: 5 - MODERATE PAIN
PAINLEVEL_OUTOF10: 3
PAINLEVEL_OUTOF10: 10 - WORST POSSIBLE PAIN
PAINLEVEL_OUTOF10: 0 - NO PAIN
PAINLEVEL_OUTOF10: 4
PAINLEVEL_OUTOF10: 4
PAINLEVEL_OUTOF10: 6
PAINLEVEL_OUTOF10: 8
PAINLEVEL_OUTOF10: 4
PAINLEVEL_OUTOF10: 4
PAINLEVEL_OUTOF10: 9

## 2024-09-24 ASSESSMENT — PAIN - FUNCTIONAL ASSESSMENT
PAIN_FUNCTIONAL_ASSESSMENT: 0-10
PAIN_FUNCTIONAL_ASSESSMENT: UNABLE TO SELF-REPORT
PAIN_FUNCTIONAL_ASSESSMENT: 0-10
PAIN_FUNCTIONAL_ASSESSMENT: UNABLE TO SELF-REPORT
PAIN_FUNCTIONAL_ASSESSMENT: 0-10

## 2024-09-24 ASSESSMENT — PAIN DESCRIPTION - ORIENTATION
ORIENTATION: ANTERIOR;UPPER
ORIENTATION: UPPER;ANTERIOR
ORIENTATION: ANTERIOR;UPPER
ORIENTATION: ANTERIOR;UPPER
ORIENTATION: RIGHT;LEFT

## 2024-09-24 ASSESSMENT — PAIN DESCRIPTION - LOCATION
LOCATION: ABDOMEN

## 2024-09-24 ASSESSMENT — PATIENT HEALTH QUESTIONNAIRE - PHQ9
SUM OF ALL RESPONSES TO PHQ9 QUESTIONS 1 & 2: 0
2. FEELING DOWN, DEPRESSED OR HOPELESS: NOT AT ALL
1. LITTLE INTEREST OR PLEASURE IN DOING THINGS: NOT AT ALL

## 2024-09-24 ASSESSMENT — LIFESTYLE VARIABLES
AUDIT-C TOTAL SCORE: 2
HOW OFTEN DO YOU HAVE A DRINK CONTAINING ALCOHOL: 2-4 TIMES A MONTH
AUDIT-C TOTAL SCORE: 2
SKIP TO QUESTIONS 9-10: 1
HOW MANY STANDARD DRINKS CONTAINING ALCOHOL DO YOU HAVE ON A TYPICAL DAY: 1 OR 2
HOW OFTEN DO YOU HAVE 6 OR MORE DRINKS ON ONE OCCASION: NEVER

## 2024-09-24 ASSESSMENT — COLUMBIA-SUICIDE SEVERITY RATING SCALE - C-SSRS
1. IN THE PAST MONTH, HAVE YOU WISHED YOU WERE DEAD OR WISHED YOU COULD GO TO SLEEP AND NOT WAKE UP?: NO
6. HAVE YOU EVER DONE ANYTHING, STARTED TO DO ANYTHING, OR PREPARED TO DO ANYTHING TO END YOUR LIFE?: NO
2. HAVE YOU ACTUALLY HAD ANY THOUGHTS OF KILLING YOURSELF?: NO

## 2024-09-24 NOTE — ANESTHESIA PROCEDURE NOTES
Peripheral IV  Date/Time: 9/24/2024 8:00 AM      Placement  Needle size: 16 G  Laterality: right  Location: hand  Local anesthetic: none  Site prep: alcohol  Technique: anatomical landmarks  Attempts: 1

## 2024-09-24 NOTE — BRIEF OP NOTE
Date: 2024  OR Location: [facility] OR    Name: Bonnie Robledo, : 1955, Age: 69 y.o., MRN: [id number], Sex: female    Diagnosis  Pre-op Diagnosis      * Median arcuate ligament syndrome (CMS-HCC) [I77.4] Post-op Diagnosis     * Median arcuate ligament syndrome (CMS-HCC) [I77.4]     Procedures  Release Laparoscopy Median Arcuate Ligament  09232 - ME UNLISTED PROCEDURE VASCULAR SURGERY      Surgeons      * Cl Farley - Primary    Resident/Fellow/Other Assistant:  Surgeons and Role:     * Nicolle Kumar MD MPH - Assisting     * Rudy Ramírez MD - Resident - Assisting    Procedure Summary  Anesthesia: General  ASA: II  Anesthesia Staff: Anesthesiologist: Farida Avina MD  CRNA: CEZAR Soriano-CRNA  Anesthesia Resident: Yary Long MD; Eric Bearden DO  Estimated Blood Loss: 5 mL  Intra-op Medications: Administrations occurring from 0635 to 0855 on 24:  * No intraprocedure medications in log *           Anesthesia Record               Intraprocedure I/O Totals       None           Specimen: No specimens collected     Staff:   Circulator: Paul Velasco Person: Sinai  Circulator: Frank          Findings: Ligamentous attachments with compression of the celiac axis    Complications:  None; patient tolerated the procedure well.     Disposition: PACU - hemodynamically stable.  Condition: stable  Specimens Collected: No specimens collected  Attending Attestation: I was present and scrubbed for the entire procedure.    Cl Farley  Phone Number: [phone]

## 2024-09-24 NOTE — ANESTHESIA PROCEDURE NOTES
Arterial Line:    Date/Time: 9/24/2024 8:02 AM    Staffing  Performed: resident   Authorized by: Farida Avina MD    Performed by: Eric Bearden DO    An arterial line was placed. Procedure performed using surface landmarks.in the OR for the following indication(s): continuous blood pressure monitoring and blood sampling needed.    A 20 gauge (size), 1 and 3/4 inch (length), Angiocath (type) catheter was placed into the Left radial artery, secured by Tegaderm,   Seldinger technique not used.  Events:  patient tolerated procedure well with no complications.

## 2024-09-24 NOTE — SIGNIFICANT EVENT
POST OP CHECK NOTE    Subjective  Bonnie Robledo is a  69 y.o. female now POD 0 s/p laparoscopic median arcuate ligament release. She tolerated the procedure well.  Patient was  extubated at the end of the case.     Pain is well controlled on current pain regimen. No complaints of headaches, N/V, chest pain, SOB.     Objective  Vitals:    09/24/24 1640   BP:    Pulse: 77   Resp: 13   Temp:    SpO2: 98%       Physical Exam:  General: laying bed, in NAD  HEENT: MMM, PERRL, iEOM  CV: RRR, capillary refill <2  Resp: breathing comfortably on NC  Abdomen/GI: laparoscopic incisions covered with dermabond, c/d/I, soft, appropriately tender to palpation, ND, nonperitonitic  :  voiding independently  MSK: strength 5/5  Neuro: Aox3, no focal deficits, CN 1-12     Assessment/Plan:  Bonnie Robledo is a  69 y.o. female now POD 0 s/p laparoscopic median arcuate ligament release. She tolerated the procedure well.     -pain control per primaryteam  - okay for CLD, per gen surg can Advance to FULLs as tolerated  - UGI in AM  - remainder of care per general surgery, vascular surgery will follow    Ting Martinez MD  PGY1 Vascular Surgery  [id number]  Available via secure chat

## 2024-09-24 NOTE — OP NOTE
Release Laparoscopy Median Arcuate Ligament Operative Note     Date: 2024  OR Location: [facility] OR    Name: Bonnie Robledo, : 1955, Age: 69 y.o., MRN: [id number], Sex: female    Diagnosis  Pre-op Diagnosis      * Median arcuate ligament syndrome (CMS-HCC) [I77.4] Post-op Diagnosis     * Median arcuate ligament syndrome (CMS-HCC) [I77.4]     Procedures  Release Laparoscopy Median Arcuate Ligament  51187 - NM UNLISTED PROCEDURE VASCULAR SURGERY      Surgeons      * Cl Farley - Primary    Resident/Fellow/Other Assistant:  Surgeons and Role:     * Nicolle Kumar MD MPH - Assisting     * Rudy Ramírez MD - Resident - Assisting    Procedure Summary  Anesthesia: General  ASA: II  Anesthesia Staff: Anesthesiologist: Farida Avina MD  Anesthesia Resident: Eric Bearden DO  Estimated Blood Loss: 5mL  Intra-op Medications: Administrations occurring from 0635 to 0855 on 24:  * No intraprocedure medications in log *           Anesthesia Record               Intraprocedure I/O Totals       None           Specimen: No specimens collected     Staff:   Circulator: Paul Velasco Person: Sinai  Circulator: Frank         Drains and/or Catheters: * None in log *    Tourniquet Times:         Implants:     Findings:           Indications: Bonnie Robledo is an 69 y.o. female who is having surgery for Median arcuate ligament syndrome (CMS-HCC) [I77.4].     The patient was seen in the preoperative area. The risks, benefits, complications, treatment options, non-operative alternatives, expected recovery and outcomes were discussed with the patient. The possibilities of reaction to medication, pulmonary aspiration, injury to surrounding structures, bleeding, recurrent infection, the need for additional procedures, failure to diagnose a condition, and creating a complication requiring transfusion or operation were discussed with the patient. The patient concurred with the proposed plan, giving informed consent.  The  site of surgery was properly noted/marked if necessary per policy. The patient has been actively warmed in preoperative area. Preoperative antibiotics have been ordered and given within 1 hours of incision. Venous thrombosis prophylaxis are not indicated.    Procedure Details:   Supine position, abdomen prepped and draped.  The ports placed by general surgery, 12 mm port in the parent median position slightly above the umbilicus 15 cm below the xiphoid process and to ports on the left side in the midclavicular and axillary line after placing Blocks under direct visualization and establishment of pneumoperitoneum.  Another 1 placed in the right midclavicular line through the falciform ligament after a right sided tap block.  The NG tube required manipulation to enter the stomach suggesting a stricture at the hiatus.  The pars flaccida was opened and the right esperanza of the diaphragm was divided exposing the abdominal aorta.  Dissection of the abdominal aorta and freeing from overlying structures was undertaken distally and the takeoff of the celiac axis was seen going slightly anterolaterally towards the left side.  The overlying fibers of the median arcuate ligament and plexus were divided exposing the celiac axis down to its bifurcation and the hepatic artery was also fully mobilized beyond the compression.  There was a slight dilatation at the poststenotic dilatation at the terminus of the celiac axis.  The area was inspected for hemostasis.  At this point general surgery took over for diaphragmatic repair of the hiatal hernia, please refer to their note.      Complications:  None; patient tolerated the procedure well.    Disposition: PACU - hemodynamically stable.  Condition: stable         Additional Details:     Attending Attestation: I was present and scrubbed for the key portions of the procedure.    Cl Farley  Phone Number: [phone]

## 2024-09-24 NOTE — SIGNIFICANT EVENT
Post Op Check Note  [facility]    S:   POD 0 from laparoscopic median arcuate ligament release. Patient seen and assessed at bedside. Reported significant pain immediately post-op, however states that the pain is better controlled now. Has ambulated to go to bathroom. Denies any nausea/vomiting, fever/chills, chest pain, shortness of breath, focal weakness, or paresthesia.     O:   Visit Vitals  /69   Pulse 68   Temp 36.2 °C (97.2 °F) (Temporal)   Resp 15      Constitutional: no acute distress  HEENT: Atraumatic, no scleral icterus  Cardiac: Regular rate  Pulmonary: Unlabored respiration  Abdomen: Minimally distended, mildly tender to palpation along epigastric area and L side of abdomen. No guarding or rebound. Laparoscopy incisions with overlying dermabond in place, clean/dry/intact.   Skin: warm and dry overall   Neuro: A/O x4, no gross deficits     A/P:  Ms. Robledo is a 69F POD 0 s/p laparoscopic median arcuate ligament release. Overall, patient is doing well postoperatively with no acute concerns.  - Multimodal pain control  - Wean O2 as tolerated, encourage IS  - As patient tolerates clear liquids, advance to full liquids  - Antiemetics as needed for nausea  - Activity as tolerated    Maciel Pace MD  General Surgery PGY1  [facility] [id number]

## 2024-09-24 NOTE — ANESTHESIA PROCEDURE NOTES
Peripheral IV  Date/Time: 9/24/2024 8:05 AM      Placement  Needle size: 14 G  Laterality: left  Location: wrist  Local anesthetic: none  Site prep: alcohol  Technique: anatomical landmarks  Attempts: 1

## 2024-09-24 NOTE — ANESTHESIA POSTPROCEDURE EVALUATION
Patient: Bonnie Robledo    Procedure Summary       Date: 09/24/24 Room / Location: [facility] OR 12 / [facility] OR    Anesthesia Start: 0742 Anesthesia Stop: 1056    Procedure: Release Laparoscopy Median Arcuate Ligament (Abdomen) Diagnosis:       Median arcuate ligament syndrome (CMS-HCC)      (Median arcuate ligament syndrome (CMS-HCC) [I77.4])    Surgeons: Cl Farley MD Responsible Provider: Farida Avina MD    Anesthesia Type: general ASA Status: 2            Anesthesia Type: general    Vitals Value Taken Time   /89 09/24/24 1045   Temp 36.1 °C (97 °F) 09/24/24 1056   Pulse 90 09/24/24 1054   Resp 27 09/24/24 1053   SpO2 97 % 09/24/24 1054   Vitals shown include unfiled device data.    Anesthesia Post Evaluation    Patient location during evaluation: PACU  Patient participation: complete - patient cannot participate  Level of consciousness: sleepy but conscious  Pain management: adequate  Airway patency: patent  Cardiovascular status: acceptable and hemodynamically stable  Respiratory status: acceptable and face mask  Hydration status: acceptable  Postoperative Nausea and Vomiting: none        No notable events documented.

## 2024-09-24 NOTE — ANESTHESIA PREPROCEDURE EVALUATION
Patient: Bonnie Robledo    Procedure Information       Date/Time: 09/24/24 0635    Procedure: Release Laparoscopy Median Arcuate Ligament    Location: [facility] OR 12 / [facility]    Surgeons: Cl Farley MD            Relevant Problems   Anesthesia (within normal limits)      Cardiac   (+) HTN (hypertension)      Pulmonary (within normal limits)      Neuro  Benign schwannoma s/p resection       GI (within normal limits)      /Renal (within normal limits)      Liver (within normal limits)      Endocrine   (+) Hypothyroidism      Hematology (within normal limits)      Musculoskeletal (within normal limits)      HEENT (within normal limits)      ID (within normal limits)      Skin (within normal limits)      GYN (within normal limits)       Clinical information reviewed:   Tobacco  Allergies  Meds   Med Hx  Surg Hx  OB Status  Fam Hx  Soc   Hx        NPO Detail:  NPO/Void Status  Carbohydrate Drink Given Prior to Surgery? : N  Date of Last Liquid: 09/24/24  Time of Last Liquid: 0000  Date of Last Solid: 09/24/24  Time of Last Solid: 0000  Last Intake Type: Clear fluids  Time of Last Void: 0000         Physical Exam    Airway  Mallampati: II  TM distance: >3 FB  Neck ROM: full     Cardiovascular - normal exam     Dental - normal exam     Pulmonary - normal exam     Abdominal - normal exam             Anesthesia Plan    History of general anesthesia?: yes  History of complications of general anesthesia?: no    ASA 2     general     intravenous induction   Postoperative administration of opioids is intended.  Trial extubation is planned.  Anesthetic plan and risks discussed with patient.  Use of blood products discussed with patient who.    Plan discussed with attending.

## 2024-09-24 NOTE — ANESTHESIA PROCEDURE NOTES
Airway  Date/Time: 9/24/2024 7:54 AM  Urgency: elective    Airway not difficult    Staffing  Performed: resident   Authorized by: Farida Avina MD    Performed by: Eric Bearden DO  Patient location during procedure: OR    Indications and Patient Condition  Indications for airway management: anesthesia  Spontaneous Ventilation: absent  Sedation level: deep  Preoxygenated: yes  Patient position: sniffing  MILS maintained throughout  Mask difficulty assessment: 1 - vent by mask  Planned trial extubation    Final Airway Details  Final airway type: endotracheal airway      Successful airway: ETT  Cuffed: yes   Successful intubation technique: direct laryngoscopy  Facilitating devices/methods: intubating stylet  Endotracheal tube insertion site: oral  Blade: Ana Paula  Blade size: #3  ETT size (mm): 7.0  Cormack-Lehane Classification: grade IIa - partial view of glottis  Placement verified by: chest auscultation and capnometry   Measured from: lips  ETT to lips (cm): 21  Number of attempts at approach: 2  Ventilation between attempts: BVM  Number of other approaches attempted: 0

## 2024-09-25 ENCOUNTER — APPOINTMENT (OUTPATIENT)
Dept: RADIOLOGY | Facility: HOSPITAL | Age: 69
End: 2024-09-25
Payer: MEDICARE

## 2024-09-25 ENCOUNTER — APPOINTMENT (OUTPATIENT)
Dept: CARDIOLOGY | Facility: HOSPITAL | Age: 69
End: 2024-09-25
Payer: MEDICARE

## 2024-09-25 LAB
ALBUMIN SERPL BCP-MCNC: 3.6 G/DL (ref 3.4–5)
ALP SERPL-CCNC: 57 U/L (ref 33–136)
ALT SERPL W P-5'-P-CCNC: 37 U/L (ref 7–45)
ANION GAP SERPL CALC-SCNC: 12 MMOL/L (ref 10–20)
AST SERPL W P-5'-P-CCNC: 57 U/L (ref 9–39)
BILIRUB SERPL-MCNC: 0.7 MG/DL (ref 0–1.2)
BLOOD EXPIRATION DATE: NORMAL
BLOOD EXPIRATION DATE: NORMAL
BUN SERPL-MCNC: 8 MG/DL (ref 6–23)
CALCIUM SERPL-MCNC: 8.6 MG/DL (ref 8.6–10.6)
CARDIAC TROPONIN I PNL SERPL HS: 14 NG/L (ref 0–34)
CHLORIDE SERPL-SCNC: 103 MMOL/L (ref 98–107)
CHOLEST SERPL-MCNC: 181 MG/DL (ref 0–199)
CHOLESTEROL/HDL RATIO: 2.5
CO2 SERPL-SCNC: 26 MMOL/L (ref 21–32)
CREAT SERPL-MCNC: 0.77 MG/DL (ref 0.5–1.05)
DISPENSE STATUS: NORMAL
DISPENSE STATUS: NORMAL
EGFRCR SERPLBLD CKD-EPI 2021: 85 ML/MIN/1.73M*2
ERYTHROCYTE [DISTWIDTH] IN BLOOD BY AUTOMATED COUNT: 12.7 % (ref 11.5–14.5)
EST. AVERAGE GLUCOSE BLD GHB EST-MCNC: 111 MG/DL
GLUCOSE SERPL-MCNC: 108 MG/DL (ref 74–99)
HBA1C MFR BLD: 5.5 %
HCT VFR BLD AUTO: 37.1 % (ref 36–46)
HDLC SERPL-MCNC: 71.4 MG/DL
HGB BLD-MCNC: 11.9 G/DL (ref 12–16)
LDLC SERPL CALC-MCNC: 93 MG/DL
MCH RBC QN AUTO: 30.1 PG (ref 26–34)
MCHC RBC AUTO-ENTMCNC: 32.1 G/DL (ref 32–36)
MCV RBC AUTO: 94 FL (ref 80–100)
NON HDL CHOLESTEROL: 110 MG/DL (ref 0–149)
NRBC BLD-RTO: 0 /100 WBCS (ref 0–0)
PLATELET # BLD AUTO: 235 X10*3/UL (ref 150–450)
POTASSIUM SERPL-SCNC: 4.1 MMOL/L (ref 3.5–5.3)
PRODUCT BLOOD TYPE: 6200
PRODUCT BLOOD TYPE: 6200
PRODUCT CODE: NORMAL
PRODUCT CODE: NORMAL
PROT SERPL-MCNC: 5.9 G/DL (ref 6.4–8.2)
RBC # BLD AUTO: 3.96 X10*6/UL (ref 4–5.2)
SODIUM SERPL-SCNC: 137 MMOL/L (ref 136–145)
TRIGL SERPL-MCNC: 83 MG/DL (ref 0–149)
UNIT ABO: NORMAL
UNIT ABO: NORMAL
UNIT NUMBER: NORMAL
UNIT NUMBER: NORMAL
UNIT RH: NORMAL
UNIT RH: NORMAL
UNIT VOLUME: 209
UNIT VOLUME: 220
VLDL: 17 MG/DL (ref 0–40)
WBC # BLD AUTO: 8.5 X10*3/UL (ref 4.4–11.3)

## 2024-09-25 PROCEDURE — 70450 CT HEAD/BRAIN W/O DYE: CPT | Performed by: RADIOLOGY

## 2024-09-25 PROCEDURE — 73564 X-RAY EXAM KNEE 4 OR MORE: CPT | Mod: RT

## 2024-09-25 PROCEDURE — 2500000004 HC RX 250 GENERAL PHARMACY W/ HCPCS (ALT 636 FOR OP/ED): Performed by: SURGERY

## 2024-09-25 PROCEDURE — 2500000001 HC RX 250 WO HCPCS SELF ADMINISTERED DRUGS (ALT 637 FOR MEDICARE OP)

## 2024-09-25 PROCEDURE — 99222 1ST HOSP IP/OBS MODERATE 55: CPT

## 2024-09-25 PROCEDURE — 93005 ELECTROCARDIOGRAM TRACING: CPT

## 2024-09-25 PROCEDURE — 83718 ASSAY OF LIPOPROTEIN: CPT

## 2024-09-25 PROCEDURE — 80307 DRUG TEST PRSMV CHEM ANLYZR: CPT

## 2024-09-25 PROCEDURE — 73564 X-RAY EXAM KNEE 4 OR MORE: CPT | Mod: RIGHT SIDE | Performed by: STUDENT IN AN ORGANIZED HEALTH CARE EDUCATION/TRAINING PROGRAM

## 2024-09-25 PROCEDURE — 36415 COLL VENOUS BLD VENIPUNCTURE: CPT

## 2024-09-25 PROCEDURE — A9575 INJ GADOTERATE MEGLUMI 0.1ML: HCPCS | Performed by: SURGERY

## 2024-09-25 PROCEDURE — 74240 X-RAY XM UPR GI TRC 1CNTRST: CPT

## 2024-09-25 PROCEDURE — 70450 CT HEAD/BRAIN W/O DYE: CPT

## 2024-09-25 PROCEDURE — 70553 MRI BRAIN STEM W/O & W/DYE: CPT

## 2024-09-25 PROCEDURE — 70544 MR ANGIOGRAPHY HEAD W/O DYE: CPT

## 2024-09-25 PROCEDURE — 84075 ASSAY ALKALINE PHOSPHATASE: CPT | Performed by: SURGERY

## 2024-09-25 PROCEDURE — 70547 MR ANGIOGRAPHY NECK W/O DYE: CPT

## 2024-09-25 PROCEDURE — 2550000001 HC RX 255 CONTRASTS: Performed by: SURGERY

## 2024-09-25 PROCEDURE — 84484 ASSAY OF TROPONIN QUANT: CPT

## 2024-09-25 PROCEDURE — 83036 HEMOGLOBIN GLYCOSYLATED A1C: CPT

## 2024-09-25 PROCEDURE — 70553 MRI BRAIN STEM W/O & W/DYE: CPT | Performed by: RADIOLOGY

## 2024-09-25 PROCEDURE — 7100000011 HC EXTENDED STAY RECOVERY HOURLY - NURSING UNIT

## 2024-09-25 PROCEDURE — 2500000001 HC RX 250 WO HCPCS SELF ADMINISTERED DRUGS (ALT 637 FOR MEDICARE OP): Performed by: SURGERY

## 2024-09-25 PROCEDURE — 74240 X-RAY XM UPR GI TRC 1CNTRST: CPT | Performed by: RADIOLOGY

## 2024-09-25 PROCEDURE — 36415 COLL VENOUS BLD VENIPUNCTURE: CPT | Performed by: SURGERY

## 2024-09-25 PROCEDURE — 85027 COMPLETE CBC AUTOMATED: CPT | Performed by: SURGERY

## 2024-09-25 RX ORDER — GADOTERATE MEGLUMINE 376.9 MG/ML
15 INJECTION INTRAVENOUS
Status: COMPLETED | OUTPATIENT
Start: 2024-09-25 | End: 2024-09-25

## 2024-09-25 RX ORDER — DIATRIZOATE MEGLUMINE AND DIATRIZOATE SODIUM 660; 100 MG/ML; MG/ML
85 SOLUTION ORAL; RECTAL ONCE
Status: COMPLETED | OUTPATIENT
Start: 2024-09-25 | End: 2024-09-25

## 2024-09-25 ASSESSMENT — ENCOUNTER SYMPTOMS
SPEECH DIFFICULTY: 0
WEAKNESS: 0
TREMORS: 0
CARDIOVASCULAR NEGATIVE: 1
RESPIRATORY NEGATIVE: 1
HEADACHES: 0
CONSTITUTIONAL NEGATIVE: 1
ABDOMINAL PAIN: 1
SHORTNESS OF BREATH: 0
LIGHT-HEADEDNESS: 0
FEVER: 0
PALPITATIONS: 0
NUMBNESS: 0

## 2024-09-25 ASSESSMENT — PAIN DESCRIPTION - LOCATION: LOCATION: ABDOMEN

## 2024-09-25 ASSESSMENT — PAIN SCALES - GENERAL
PAINLEVEL_OUTOF10: 7
PAINLEVEL_OUTOF10: 6
PAINLEVEL_OUTOF10: 5 - MODERATE PAIN
PAINLEVEL_OUTOF10: 2
PAINLEVEL_OUTOF10: 2
PAINLEVEL_OUTOF10: 3
PAINLEVEL_OUTOF10: 4
PAINLEVEL_OUTOF10: 3

## 2024-09-25 ASSESSMENT — PAIN - FUNCTIONAL ASSESSMENT
PAIN_FUNCTIONAL_ASSESSMENT: 0-10

## 2024-09-25 ASSESSMENT — COGNITIVE AND FUNCTIONAL STATUS - GENERAL
DAILY ACTIVITIY SCORE: 24
MOBILITY SCORE: 22
WALKING IN HOSPITAL ROOM: A LITTLE
CLIMB 3 TO 5 STEPS WITH RAILING: A LITTLE

## 2024-09-25 ASSESSMENT — PAIN DESCRIPTION - DESCRIPTORS
DESCRIPTORS: SORE

## 2024-09-25 NOTE — NURSING NOTE
Pt informed me that she had a negative experience with night shift RN and would like to file a complaint. I informed nurse manager, Flori, who went and spoke to pt regarding her experience.

## 2024-09-25 NOTE — CONSULTS
Consults    History Of Present Illness  Bonnie Robledo is a 69 y.o. female with medical hx of HTN, hypothyroidism, and median arcuate ligament syndrome. Neurology consulted for incidental left cerebellar hypodensity finding on CT following fall.     Patient states during surgery they found incidental hiatal hernia aside from the scheduled median arcuate ligament release. She states both were repaired but following surgery the abdominal pain was unbearable and she describes it worse than childbirth. Because of the pain she received Dilaudid and Oxycodone. Following the administration the pain was significantly reduced, however when she went to stand up to go to the restroom she felt drowsy leading to the fall. She fell from standing position hitting her forehead. She did not have LOC or confusion after the fall and the nurse helped her stand. Because of this fall, a CTH was ordered to check for any bleeds or hematomas following the impact. On the CTH there was an incidental finding of a left cerebellar hypodensity which may represent recent infarct versus microangiopathic change versus artifact. MRI was ordered to further evaluate.     In terms of risk factors patient has a hx of HTN diagnosed outpatient in 2023 for which she takes Losartan. Per patient she checks her BP everyday at home. She also has about 4 alcoholic drinks per week with choices being wine or old fashioned. She denies any other risk factor such as DM2, HLD, or smoking.     Last known well: Prior to surgery  at 8:46 am  Had stroke symptoms resolved at time of presentation: Yes    Past Medical History  Past Medical History:   Diagnosis Date    Cataracts, bilateral     History of benign schwannoma     HTN (hypertension)     Hypothyroid     Hypothyroidism     Median arcuate ligament syndrome (CMS-HCC)     Right upper quadrant abdominal pain      Surgical History  Past Surgical History:   Procedure Laterality Date     SECTION,  CLASSIC      x2    CHOLECYSTECTOMY      OTHER SURGICAL HISTORY      ight retroperitoneal schwannoma s/p resection 2013     Social History  Social History     Tobacco Use    Smoking status: Never    Smokeless tobacco: Never   Vaping Use    Vaping status: Never Used   Substance Use Topics    Alcohol use: Yes     Alcohol/week: 6.0 standard drinks of alcohol     Types: 2 Glasses of wine, 4 Standard drinks or equivalent per week     Comment: 4 week    Drug use: Never     Allergies  Omeprazole  Home Medications  Medications Prior to Admission   Medication Sig Dispense Refill Last Dose    chlorhexidine (Hibiclens) 4 % external liquid Use as directed daily preoperatively 473 mL 0 9/24/2024    chlorhexidine (Peridex) 0.12 % solution Swish and spit with 15ml of solution the night before and morning of surgery. Do not swallow. 15 mL 0 9/24/2024    cholecalciferol (Vitamin D3) 50 MCG (2000 UT) tablet Take 1 tablet (50 mcg) by mouth once daily.   Past Week    cyanocobalamin, vitamin B-12, (VITAMIN B-12 ORAL) Take 1 tablet by mouth once daily.   Past Week    Linzess 145 mcg capsule Take 1 capsule (145 mcg) by mouth once daily in the morning. Take before meals.   9/23/2024 at 1200    losartan (Cozaar) 50 mg tablet Take 1 tablet (50 mg) by mouth once daily.   9/23/2024 at 0700    Synthroid 25 mcg tablet 1 tablet (25 mcg) once daily in the evening.   9/23/2024 at 2300    polyethylene glycol (Glycolax, Miralax) 17 gram packet Take 17 g by mouth once daily. In the evening          Review of Systems   Constitutional: Negative.  Negative for fever.   HENT: Negative.     Respiratory: Negative.  Negative for shortness of breath.    Cardiovascular: Negative.  Negative for chest pain and palpitations.   Gastrointestinal:  Positive for abdominal pain (Post surgical).   Musculoskeletal:  Positive for gait problem (Attributed to post surgical abdominal pain).   Neurological:  Negative for tremors, speech difficulty, weakness, light-headedness,  numbness and headaches.        Drowsiness. Patient attributes to pain meds. Sx not present prior to surgery. States gait feels funny and thinks the meds and surgery pain might be contributing.      Neurological Exam  Mental Status  Alert. Oriented to person, place, time and situation. Able to say name, location, and date. Proper knowledge of situation and events.. Oriented to person, place, and time. no dysarthria present. Able to name objects and repeat. Follows three-step commands. Attention and concentration are normal. Fund of knowledge is appropriate for level of education. Apraxia absent.    Gait   Abnormal gait: Could no assess. Exm limited to postsurgical abdominal pain.    Physical Exam  Constitutional:       General: She is not in acute distress.     Appearance: Normal appearance.   HENT:      Head: Normocephalic and atraumatic.   Cardiovascular:      Rate and Rhythm: Normal rate and regular rhythm.      Heart sounds: Normal heart sounds.   Pulmonary:      Effort: Pulmonary effort is normal. No respiratory distress.      Breath sounds: Normal breath sounds.   Abdominal:      Tenderness: There is abdominal tenderness. There is guarding.   Musculoskeletal:         General: Swelling and tenderness present.      Cervical back: No rigidity or tenderness.      Comments: in her upper extremities due to IV   Skin:     General: Skin is warm.   Neurological:      General: No focal deficit present.      Mental Status: She is alert and oriented to person, place, and time. Mental status is at baseline.      Cranial Nerves: No dysarthria.      Sensory: No sensory deficit.      Motor: No weakness.      Coordination: Coordination normal.      Gait: Abnormal gait: Could no assess. Exm limited to postsurgical abdominal pain.     CRANIAL NERVES:  Cranial nerves were normal.       CN 2- visual fields full to confrontation.   CN 3, 4, 6-  Pupils round, 4 mm in diameter, equally reactive to light. No ptosis. EOMs normal  "alignment, full range of movement, no nystagmus      CN 5- Facial sensation intact bilaterally. Corneal reflex not assessed.       CN 7- Normal and symmetric facial strength. Nasolabial folds symmetric.      CN 8- Hearing intact to finger rub unilaterally and bilaterally.       CN 9- Palate elevates symmetrically. Gag reflex not assessed.       CN 11- Normal strength of shoulder shrug and neck turning       CN 12- Tongue midline, with normal bulk and strength; no fasciculations.      MOTOR:  Motor exam was normal.   Muscle bulk: normal bulk. No atrophy appreciated.  Muscle tone: MAS score 0. No hypertonia or hypotonia,   Muscle strength Right/Left: Bicep 5/5, Tricep 5/5, Deltoid 5/5, Quadricept 5/5, Hamstring 5/5  Fasciculations, tremor or other abnormal movements: Absent.     REFLEXES: 3+ throughout.  Right/ Left:  Biceps 3/3, brachioradialis 3/3, triceps 3/3, patellar 3/3, ankle 3/3.  Babinski: toes downgoing to plantar stimulation.   Clonus, frontal release signs or other pathologic reflexes: Not present.     SENSORY: Sensory exam was intact.  Light touch: Intact to touch while performing neuro exam  Sharp/dull: Intact to sharp  Vibration and Proprioception: Intact to position of toe.     COORDINATION:    Finger-nose-finger: Smooth, rhythmical, and precise.  Heel-to-shin: Smooth, rhythmical, and precise. Elicited Abdominal pain.   Lurias three step: Able to perform twice after command. No mistakes in order of steps.      GAIT: Could not assess due to abdominal pain. However states she walked with the nurse earlier and while felt different from normal gait she did not feel like she was about to fall.   Romberg sign: Deferred.   Gait: Deferred.   Tandem gait: Deferred.   Ataxia, shuffling, steppage or waddling:  Deferred.     Last Recorded Vitals  Blood pressure 122/74, pulse 85, temperature 37.2 °C (99 °F), temperature source Temporal, resp. rate 16, height 1.651 m (5' 5\"), weight 84.9 kg (187 lb 2.7 oz), SpO2 " 99%.    Relevant Results  Scheduled medications  gabapentin, 300 mg, oral, TID  pantoprazole, 40 mg, oral, Daily before breakfast   Or  pantoprazole, 40 mg, intravenous, Daily before breakfast  sennosides-docusate sodium, 1 tablet, oral, BID      Continuous medications     PRN medications  PRN medications: acetaminophen, bisacodyl, HYDROmorphone, naloxone, ondansetron **OR** ondansetron, oxyCODONE, simethicone   No MRI head results found for the past 14 days  Results for orders placed or performed during the hospital encounter of 09/24/24 (from the past 24 hour(s))   CBC   Result Value Ref Range    WBC 8.5 4.4 - 11.3 x10*3/uL    nRBC 0.0 0.0 - 0.0 /100 WBCs    RBC 3.96 (L) 4.00 - 5.20 x10*6/uL    Hemoglobin 11.9 (L) 12.0 - 16.0 g/dL    Hematocrit 37.1 36.0 - 46.0 %    MCV 94 80 - 100 fL    MCH 30.1 26.0 - 34.0 pg    MCHC 32.1 32.0 - 36.0 g/dL    RDW 12.7 11.5 - 14.5 %    Platelets 235 150 - 450 x10*3/uL   Comprehensive metabolic panel   Result Value Ref Range    Glucose 108 (H) 74 - 99 mg/dL    Sodium 137 136 - 145 mmol/L    Potassium 4.1 3.5 - 5.3 mmol/L    Chloride 103 98 - 107 mmol/L    Bicarbonate 26 21 - 32 mmol/L    Anion Gap 12 10 - 20 mmol/L    Urea Nitrogen 8 6 - 23 mg/dL    Creatinine 0.77 0.50 - 1.05 mg/dL    eGFR 85 >60 mL/min/1.73m*2    Calcium 8.6 8.6 - 10.6 mg/dL    Albumin 3.6 3.4 - 5.0 g/dL    Alkaline Phosphatase 57 33 - 136 U/L    Total Protein 5.9 (L) 6.4 - 8.2 g/dL    AST 57 (H) 9 - 39 U/L    Bilirubin, Total 0.7 0.0 - 1.2 mg/dL    ALT 37 7 - 45 U/L      CT head wo IV contrast 9/25/2024  No acute intraparenchymal hemorrhage or calvarial fracture.   Focal area of hypoattenuation within the left cerebellum in the territory of the left posteroinferior cerebellar artery, which may represent recent infarct versus microangiopathic change versus artifact. Consider MRI for further evaluation.   Severe supratentorial microangiopathic changes noted.   Findings were discussed with ISMAEL Lieberman at  9:38am by Dr. Tessa France.   I personally reviewed the images/study and I agree with the findings as stated by Ritchie France MD (Radiology Resident).   MACRO: None   Signed by: Deborah Paige 9/25/2024 9:43 AM Dictation workstation:   [id number]      IV Thrombolysis Given: No; Thrombolysis contraindication reason: Time from Last Known Well (or stroke onset) is >4.5 hours        Assessment/Plan   Assessment & Plan  Median arcuate ligament syndrome (CMS-HCC)    HTN (hypertension)    Hypothyroidism      Ms Robledo is a 70yo F admitted for median arcuate ligament release surgery, requiring pain management post-op. She had an unwitnessed fall on 9/24 while drowsy from pain medications. CTH was done and incidentally found a hypoattenuation in the L cerebellum. Neurological exam is non-focal, slightly limited by abdominal pain. Would recommend MRI brain to further evaluate this L cerebellar abnormality.    Type: unclear if stroke  Subtype/etiology: unclear  Vessels involved: Unclear  Neurological manifestations: Mild gait disturbance following surgery and pain meds.   NIHSS (worst at presentation): 0   Diagnostic evaluation: CT head, MRI w and w/o contrast, MRA  Antiplatelet/antithrombotic plan for stroke prevention: None  VTE prophylaxis: None    Vascular Risk Factor modification goals:  Blood pressure goals: avoid hypotension SBP <100 that could worsen cerebral perfusion, Normotension  Lipid Goals: education on healthy diet  Glucose Goals: early treatment of hyperglycemia to goal glucose 140-180 mg/dl with long-term goal A1c < 7%   Smoking Cessation and Education  Assessment for Rehabilitation needs   Patient and family education on signs and symptoms of stroke, calling 911, healthy strategies for stroke prevention.      Recommendations:  MRI brain w/wo contrast  MRA H&N without contrast  Other stroke work-up  EKG, troponin  Utox  TTE with bubble study  Lipid panel, A1c  Will defer recommending  antiplatelet therapy or statin until clear if this is infarction  sBP goal Normotension  NPO until nurse bedside swallow assessment   Consider focused stroke order set     AKIKO FATIMA MS4    I participated and contributed to the above medical student note including the HPI, physical exam, assessment, and plan. I agree with the above information.    Rachel Arcos MD   PGY-3 Neurology  Stroke [phone]

## 2024-09-25 NOTE — PROGRESS NOTES
"  VASCULAR SURGERY PROGRESS NOTE  Assessment/Plan   Bonnie Robledo is 69 y.o. female with history of MALS who is POD1 from laparoscopic MALS release, hiatal hernia repair. Overnight, tripped and fell while getting up to go to the bathroom, struck head, no LOC. Neurologically intact this AM. Endorses moderate abdominal pain, no nausea/vomiting with CLD. Progressing well postop.     Plan:  UGI today, advance diet per [facility] team  CTH given unwitnessed fall with head strike  Recommend addition of PRN PO valium for muscle cramps  Remainder of care per [facility] team    D/w attending, Dr. Martine Car MD  PGY-3 General Surgery  Vascular Surgery [id number]        Subjective   Overnight, tripped and fell while trying to go to the bathroom. Struck head, no LOC. Denies headache, changes in vision, weakness, numbness/tingling, confusion.   Endorses moderate abdominal pain.   Tolerating clear liquids, no nausea/vomiting.  Passing gas, no bowel movements.      Objective   Vitals:  Heart Rate:  []   Temp:  [36 °C (96.8 °F)-37.2 °C (99 °F)]   Resp:  [14-20]   BP: (109-158)/(63-81)   Height:  [165.1 cm (5' 5\")]   Weight:  [78.5 kg (173 lb)-84.9 kg (187 lb 2.7 oz)]   SpO2:  [90 %-99 %]     Exam:  Constitutional: No acute distress, resting comfortably  Neuro:  AOx3, grossly intact  ENMT: moist mucous membranes  CV: no tachycardia  Pulm: non-labored on RA  GI: soft, non-distended, appropriately tender to palpation. Lap port sites clean/dry/intact  Skin: warm and dry  Musculoskeletal: moving all extremities    Labs:  Results from last 7 days   Lab Units 09/25/24  0607   WBC AUTO x10*3/uL 8.5   HEMOGLOBIN g/dL 11.9*   PLATELETS AUTO x10*3/uL 235      Results from last 7 days   Lab Units 09/25/24  0607   SODIUM mmol/L 137   POTASSIUM mmol/L 4.1   CHLORIDE mmol/L 103   CO2 mmol/L 26   BUN mg/dL 8   CREATININE mg/dL 0.77   GLUCOSE mg/dL 108*                "

## 2024-09-25 NOTE — SIGNIFICANT EVENT
"Significant Event Note Vascular/[facility]    Subjective: Bonnie Robledo is a  69 y.o. female now POD 0 s/p laparoscopic median arcuate ligament release. She tolerated the procedure well.  Patient was  extubated at the end of the case.     Paged at 4:25 that the patient had tripped and fell \"a few minutes prior\" when trying to go to the bathroom. Upon arriving at bedside, patient denied any dizziness, and stated she tripped when trying to transport with her IV pole and fell forward, she did endorse hitting her head on the trash can and acquired a superficial abrasion, approximately 4cm on her right knee.   Otherwise her physical exam is as follows:    Physical Exam:  General: laying bed, in NAD  HEENT: MMM, PERRL, iEOM  CV: RRR, capillary refill <2  Resp: breathing comfortably on NC  Abdomen/GI: laparoscopic incisions covered with dermabond, c/d/I, soft, appropriately tender to palpation, ND, nonperitonitic  :  voiding independently  MSK: strength 5/5  Neuro: Aox3, no focal deficits, CN 1-12 grossly intact    Plan:  -No neuro changes/causes to the fall, will continue to monitor, and notify day team  -Patient is not currently on any antiplatelets or anticoagulants aside from DVT Ppx SQH (5000U Q8H, last dose 2100 9/24).    Patient discussed with chief resident Dr. Simerlink.     Meseret Garg MD  General Surgery PGY-1  Sp: [id number]  Transplant Surgery: [id number]  Vascular Surgery: [id number]    I am the night resident, I can only be contacted via Epic Secure Chat or the above pagers 5:30 pm to 6:00 am.         "

## 2024-09-25 NOTE — OP NOTE
Release Laparoscopy Median Arcuate Ligament Operative Note     Date: 2024  OR Location: [facility] OR    Name: Bonnie Robledo, : 1955, Age: 69 y.o., MRN: [id number], Sex: female    Diagnosis  Pre-op Diagnosis      * Median arcuate ligament syndrome (CMS-HCC) [I77.4] Post-op Diagnosis     * Median arcuate ligament syndrome (CMS-HCC) [I77.4]     Procedures  Release Laparoscopy Median Arcuate Ligament  99648 - MI UNLISTED PROCEDURE VASCULAR SURGERY      Surgeons      * Cl Farley - Primary  Nicolle Kumar MD Primary for hiatal hernia repair, First assist for median arcuate ligament repair  Resident/Fellow/Other Assistant:  Surgeons and Role:     * Nicolle Kumar MD MPH - Assisting     * Rudy Ramírez MD - Resident - Assisting    Procedure Summary  Anesthesia: General  ASA: II  Anesthesia Staff: Anesthesiologist: Farida Avina MD  CRNA: CEZAR Soriano-CRNA  Anesthesia Resident: Yary Long MD; Eric Bearden DO  Estimated Blood Loss: 20mL  Intra-op Medications: Administrations occurring from 0635 to 0855 on 24:  * No intraprocedure medications in log *           Anesthesia Record               Intraprocedure I/O Totals          Intake    LR bolus 1500.00 mL    Total Intake 1500 mL          Specimen: No specimens collected     Staff:   Circulator: Paul Velasco Person: Sinai  Circulator: Frank         Drains and/or Catheters: * None in log *    Tourniquet Times:         Implants:     Findings: long celiac trunk, moderate sized hiatal hernia that OG tube kept getting stuck in, adhesions RUQ    Indications: Bonnie Robledo is an 69 y.o. female who is having surgery for Median arcuate ligament syndrome (CMS-HCC) [I77.4]. She has had pain in ruq and responded to celiac block    The patient was seen in the preoperative area. The risks, benefits, complications, treatment options, non-operative alternatives, expected recovery and outcomes were discussed with the patient. The possibilities of  reaction to medication, pulmonary aspiration, injury to surrounding structures, bleeding, recurrent infection, the need for additional procedures, failure to diagnose a condition, and creating a complication requiring transfusion or operation were discussed with the patient. The patient concurred with the proposed plan, giving informed consent.  The site of surgery was properly noted/marked if necessary per policy. The patient has been actively warmed in preoperative area. Preoperative antibiotics have been ordered and given within 1 hours of incision. Venous thrombosis prophylaxis have been ordered including bilateral sequential compression devices and chemical prophylaxis    Procedure: Laparoscopic hiatal hernia repair, upper endoscopy, bilateral tap blocks and median arcuate ligament release  Procedure Details: 2nd surgeon: Nicolle Kumar  1st assist: uRdy Ramírez md no qualified resident was available to assist with this hiatal hernia repair case.  The fellow assisted with my portions of the case as well and the critical portions of all of the portions of the case with Dr. Farley.  The patient was brought to the OR and placed in the split leg position.  General anesthesia was induced.  Anesthesia: general  EBL: Minimal    The patient was prepped and draped in the usual sterile fashion.  After appropriate timeout then incision was made 15 cm from xiphoid process just to the left of midline.  This was injected with Marcaine and then incision was made in a transverse fashion. An optical trocar was used to enter the peritoneal cavity.  A blunt tipped Altman trocar was placed in the abdomen insufflated CO2 to 15 mmHg. a 10 mmHg scope was inserted and the abdomen was inspected.  We placed a Leann beneath the left lobe of the liver and held in place using a Mediflex retractor and fixed to the side of the bed.  We placed a 10 mm trocar in the left midclavicular line with direct visualization.   And another 5 Mm trocar  for from base of the xiphoid process at the right costal margin.  Bilateral tap blocks were placed with 0.25% .marcaine.    then Dr. Farley joined after the TROCARS were placed.  Of note the stomach was full of air and this needed to be decompressed before we could proceed.  Anesthesia struggled with getting the OG tube and with multiple passes.  We then noted a significant opening in the hiatus where the esophagus was coming through indicating a significant hiatal hernia and we could see the tube getting stuck in this space.  At this point we also decided to do a hiatal hernia repair at the time of this procedure.  Dr. Farley and I worked together to enter the crura laterally and identified the aorta.  We dissected down to the celiac trunk and took across the ligamentous fibers.  We proceeded with a median arcuate ligament release.  Our team assisted with exposure throughout the case.  Also throughout the case I worked to help ensure that the esophagus was protected.  We proceeded with the median arcuate ligament release and the crossing fibers were taken down laparoscopically with cautery dissection.  Once the release completed we proceeded with the hiatal hernia.      We identified the medial border of the right close and cleared the hernia sac along the medial border of the right esperanza freeing the esophagus.  We dissected all the way up toward the apex.  We then turned our attention out laterally and dissected at the base of the left esperanza.  We then came back medially across the base of the esophagus and were able to bring a Penrose around the esophagus.  We also identified the posterior and anterior vagus I and these were kept against the esophagus for the entire case.  We then completed our crural dissection by taking the phrenoesophageal ligament off of the medial border of the left esperanza.  We proceeded with her mediastinal dissection to completely free up her esophagus as high as we were able to up in the  mediastinum.  We pushed the pleura back on either side to completely mobilize the esophagus.  Once we had a good 3 cm of intra-abdominal esophagus esophagus we proceeded with repair.  Of note the patient's tissues were quite fragile and we ended up closing the hiatus using 0 silk sutures reinforced with pledgets.  For such sutures were placed posteriorly and one anteriorly.  This closed up the esophageal hiatus.  We did not close all the crural fibers over the aorta distally.  We did not want to create any compression.  An intraoperative endoscopy was then performed.  The scope was brought down transorally and the esophagus intubated easily.  The patient normal-appearing esophagus with no signs of injury.  The scope passed through the hiatus easily and into the stomach easily a retroflexed view of the cardia revealed a Hill 2 valve.  No other abnormalities were noted.  The stomach was deflated the endoscope was removed.  I was present for the entire case and assisted throughout the case.  Both the fellow and I participated throughout all of this portion.  We then remove the the supraumbilical port and this was closed at the level of fascia using 0 Vicryl suture placed simple fashion is laparoscopic suture passer.  The abdomen deflated and  trocars removed.  Skin was closed all sites using 4-0 Vicryl suture placed subcuticular fashion.  Prineo was placed in the wound to the patient was awake manage patient taken recovery in good condition.    Complications:  None; patient tolerated the procedure well.    Disposition: PACU - hemodynamically stable.  Condition: stable         Additional Details: moderate sized hiatal hernia    Attending Attestation: I was present and scrubbed for the entire procedure.    Nicolle Kumar MD, MPH

## 2024-09-25 NOTE — PROGRESS NOTES
0419 Pt stated she was ambulating to the bathroom alone when she lost her balance and fell. Pt stated she fell and hit her head on the trash can but no pain indicated at this time. L knee scrape was noted. MD Meseret Garg notified and accessed pt. No new orders. Pt's  was called but no answer. Pt stated he was asleep and would reach him in the morning. Vitals wnl. /74, HR 91, RR 18 POX 93 T 98.4.

## 2024-09-25 NOTE — PROGRESS NOTES
Pharmacy Medication History Review    Bonnie Robledo is a 69 y.o. female admitted for Median arcuate ligament syndrome (CMS-HCC). Pharmacy reviewed the patient's [id number] medications and allergies for accuracy.    Medications ADDED:  none  Medications CHANGED:  Losartan 25 mg to 50 mg  Medications REMOVED:   none     The list below reflects the updated PTA list.   Prior to Admission Medications   Prescriptions Last Dose Informant   Linzess 145 mcg capsule 2024 at 1200 Self   Sig: Take 1 capsule (145 mcg) by mouth once daily in the morning. Take before meals.   Synthroid 25 mcg tablet 2024 at 2300 Self   Si tablet (25 mcg) once daily in the evening.   chlorhexidine (Hibiclens) 4 % external liquid 2024 Self   Sig: Use as directed daily preoperatively   chlorhexidine (Peridex) 0.12 % solution 2024 Self   Sig: Swish and spit with 15ml of solution the night before and morning of surgery. Do not swallow.   cholecalciferol (Vitamin D3) 50 MCG (2000 UT) tablet Past Week Self   Sig: Take 1 tablet (50 mcg) by mouth once daily.   cyanocobalamin, vitamin B-12, (VITAMIN B-12 ORAL) Past Week Self   Sig: Take 1 tablet by mouth once daily.   losartan (Cozaar) 50 mg tablet 2024 at 0700 Self   Sig: Take 1 tablet (50 mg) by mouth once daily.   polyethylene glycol (Glycolax, Miralax) 17 gram packet  Self   Sig: Take 17 g by mouth once daily. In the evening      Facility-Administered Medications: None        The list below reflects the updated allergy list. Please review each documented allergy for additional clarification and justification.  Allergies  Reviewed by Maryann Bravo, PharmD on 2024        Severity Reactions Comments    Omeprazole Medium Itching             Patient accepts M2B at discharge.     Sources:   Cardiology note from 3/7  Pharmacy dispense history  OARRs report  Patient interview- good historian (knew drug, dose and frequency)    Additional  "Comments:  NONE      Maryann Bravo, PharmD  Transitions of Care Pharmacist  09/25/24     Secure Chat preferred   If no response call [phone] or Vocera \"Med Rec\"      "

## 2024-09-25 NOTE — PROGRESS NOTES
24 [id number]   Discharge Planning   Living Arrangements Spouse/significant other   Support Systems Spouse/significant other   Assistance Needed None   Type of Residence Private residence   Do you have animals or pets at home? No   Home or Post Acute Services None   Expected Discharge Disposition Home   Does the patient need discharge transport arranged? No       DC Plannin/25:   Went in and met with the pt, confirmed demographics.   No home going needs anticipated for this pt at this time.      Dispo Plan: Home. No home going needs anticipated for this pt at this time.      Barriers: None.     ADOD:     This TCC will continue to follow for home going needs and safe DC plan.

## 2024-09-25 NOTE — CARE PLAN
The patient's goals for the shift include  manage pain level    The clinical goals for the shift include pt will remain safe and free from falls throughout shift      Problem: Fall/Injury  Goal: Not fall by end of shift  Outcome: Progressing  Goal: Be free from injury by end of the shift  Outcome: Progressing  Goal: Verbalize understanding of personal risk factors for fall in the hospital  Outcome: Progressing  Goal: Verbalize understanding of risk factor reduction measures to prevent injury from fall in the home  Outcome: Progressing  Goal: Use assistive devices by end of the shift  Outcome: Progressing  Goal: Pace activities to prevent fatigue by end of the shift  Outcome: Progressing

## 2024-09-25 NOTE — PROGRESS NOTES
"Bonnie Robledo is a 69 y.o. female on day 1 of admission presenting with Median arcuate ligament syndrome (CMS-HCC).    Subjective   Overnight patient sustained a fall with headstrike on a trash bin and hit her right knee. Has not had any new neurological symptoms since the event. Patient without any acute complaints.      Objective     Physical Exam  Vitals reviewed.   Constitutional:       General: She is not in acute distress.     Appearance: She is not toxic-appearing.   HENT:      Head: Normocephalic.      Mouth/Throat:      Mouth: Mucous membranes are moist.   Eyes:      General: No scleral icterus.     Extraocular Movements: Extraocular movements intact.      Conjunctiva/sclera: Conjunctivae normal.   Cardiovascular:      Rate and Rhythm: Normal rate.   Pulmonary:      Effort: Pulmonary effort is normal. No respiratory distress.   Abdominal:      Comments: Minimally distended, mildly tender to palpation along epigastric area and L side of abdomen. No guarding or rebound. Laparoscopy incisions with overlying dermabond in place, clean/dry/intact.    Musculoskeletal:         General: Normal range of motion.      Cervical back: Normal range of motion.   Skin:     General: Skin is warm and dry.   Neurological:      General: No focal deficit present.      Mental Status: She is alert and oriented to person, place, and time.   Psychiatric:         Mood and Affect: Mood normal.         Behavior: Behavior normal.       Last Recorded Vitals  Blood pressure 122/74, pulse 85, temperature 37.2 °C (99 °F), temperature source Temporal, resp. rate 16, height 1.651 m (5' 5\"), weight 84.9 kg (187 lb 2.7 oz), SpO2 99%.  Intake/Output last 3 Shifts:  I/O last 3 completed shifts:  In: 2343.3 (27.6 mL/kg) [P.O.:240; I.V.:603.3 (7.1 mL/kg); IV Piggyback:1500]  Out: 0 (0 mL/kg)   Weight: 84.9 kg     Relevant Results  Scheduled medications  gabapentin, 300 mg, oral, TID  pantoprazole, 40 mg, oral, Daily before breakfast   " Or  pantoprazole, 40 mg, intravenous, Daily before breakfast  sennosides-docusate sodium, 1 tablet, oral, BID      Continuous medications     PRN medications  PRN medications: acetaminophen, bisacodyl, HYDROmorphone, naloxone, ondansetron **OR** ondansetron, oxyCODONE, simethicone    Results for orders placed or performed during the hospital encounter of 09/24/24 (from the past 24 hour(s))   CBC   Result Value Ref Range    WBC 8.5 4.4 - 11.3 x10*3/uL    nRBC 0.0 0.0 - 0.0 /100 WBCs    RBC 3.96 (L) 4.00 - 5.20 x10*6/uL    Hemoglobin 11.9 (L) 12.0 - 16.0 g/dL    Hematocrit 37.1 36.0 - 46.0 %    MCV 94 80 - 100 fL    MCH 30.1 26.0 - 34.0 pg    MCHC 32.1 32.0 - 36.0 g/dL    RDW 12.7 11.5 - 14.5 %    Platelets 235 150 - 450 x10*3/uL   Comprehensive metabolic panel   Result Value Ref Range    Glucose 108 (H) 74 - 99 mg/dL    Sodium 137 136 - 145 mmol/L    Potassium 4.1 3.5 - 5.3 mmol/L    Chloride 103 98 - 107 mmol/L    Bicarbonate 26 21 - 32 mmol/L    Anion Gap 12 10 - 20 mmol/L    Urea Nitrogen 8 6 - 23 mg/dL    Creatinine 0.77 0.50 - 1.05 mg/dL    eGFR 85 >60 mL/min/1.73m*2    Calcium 8.6 8.6 - 10.6 mg/dL    Albumin 3.6 3.4 - 5.0 g/dL    Alkaline Phosphatase 57 33 - 136 U/L    Total Protein 5.9 (L) 6.4 - 8.2 g/dL    AST 57 (H) 9 - 39 U/L    Bilirubin, Total 0.7 0.0 - 1.2 mg/dL    ALT 37 7 - 45 U/L     FL upper GI w KUB    Result Date: 9/25/2024  Interpreted By:  Oliverio Middleton, STUDY: FL UPPER GI W KUB;  9/25/2024 12:15 pm   INDICATION: Signs/Symptoms:s/p hiatal hernia repair.   COMPARISON: None.   ACCESSION NUMBER(S): [id number]   ORDERING CLINICIAN: LUCIANA FREGOSO   TECHNIQUE: Initial  radiograph of the esophagus was obtained.  Multiple fluoroscopic spot images were obtained after the administration of 85mL of  Gastrografin contrast. The patient tolerated the procedure well. Fluoroscopic time was  0.5 minutes.   FINDINGS: Initial  image demonstrates  post surgical changes from recent hiatal hernia repair.    Post surgical changes from hiatal hernia repair. Fluoroscopic images demonstrate normal peristalsis with free flow of contrast through the distal esophagus and past the gastroesophageal junction without evidence of obstruction or stricture. There is no evidence of extraluminal contrast suggestive of leak.       1.  No evidence of contrast extravasation to suggest leak status post hiatal hernia repair.   I personally reviewed the images/study and I agree with the findings as stated by Oliverio Middleton MD, PGY-2 this study was interpreted at [facility], [place], [place].   MACRO: None     Dictation workstation:   [id number]    CT head wo IV contrast    Result Date: 9/25/2024  Interpreted By:  Deborah Paige,  Jovanni Dugan STUDY: CT HEAD WO IV CONTRAST;  9/25/2024 8:53 am   INDICATION: Signs/Symptoms:s/p fall, evaluate for bleed.     COMPARISON: None.   ACCESSION NUMBER(S): [id number]   ORDERING CLINICIAN: SALONI PETERS   TECHNIQUE: Noncontrast axial CT scan of head was performed. Angled reformats in brain and bone windows were generated. The images were reviewed in bone, brain, blood and soft tissue windows.   FINDINGS: CSF Spaces: The ventricles, sulci and basal cisterns are within normal limits. There is no extraaxial fluid collection.   Parenchyma: No acute intraparenchymal hemorrhage. There is focal area of hypoattenuation within the left cerebellum, in the territory of the left posteroinferior cerebellar artery (series 2, image 8). Additional scattered frontal lobe predominant periventricular and subcortical white matter hypoattenuation are likely sequelae of chronic small-vessel ischemic changes. The grey-white differentiation is intact. There is no mass effect or midline shift.   Calvarium: No calvarial fracture.   Paranasal sinuses and mastoids: Visualized paranasal sinuses and mastoids are clear.       No acute intraparenchymal hemorrhage or calvarial  fracture.   Focal area of hypoattenuation within the left cerebellum in the territory of the left posteroinferior cerebellar artery, which may represent recent infarct versus microangiopathic change versus artifact. Consider MRI for further evaluation.   Severe supratentorial microangiopathic changes noted.   Findings were discussed with ISMAEL Lieberman at 9:38am by Dr. Tessa France.   I personally reviewed the images/study and I agree with the findings as stated by Ritchie France MD (Radiology Resident).   MACRO: None   Signed by: Deborah Paige 9/25/2024 9:43 AM Dictation workstation:   [id number]    XR knee right 4+ views    Result Date: 9/25/2024  Interpreted By:  Ricci Cardenas,  Sonia Corey STUDY: XR KNEE RIGHT 4+ VIEWS; ;  9/25/2024 6:43 am   INDICATION: Signs/Symptoms:s/p fall.     COMPARISON: None.   ACCESSION NUMBER(S): [id number]   ORDERING CLINICIAN: SALONI PETERS   FINDINGS: Four views of the right knee.   No acute fracture or malalignment of the right knee.   Joint spaces are well preserved.   Soft tissues are unremarkable.       1. No acute fracture or malalignment of the right knee.     I personally reviewed the images/study and I agree with Dr. Leora Arriaza findings as stated. This study was interpreted at [place], [place]   MACRO: None   Signed by: Ricci Cardenas 9/25/2024 9:26 AM Dictation workstation:   [id number]     Assessment/Plan   Ms. 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 Course complicated by fall during the night of 9/24 with CT head demonstrating findings suspicious for cerebellar infarcts vs image artifact. Neurology consulted and continuing work-up.    Plan:  Neuro  - Per Neurology: MRI head w/ w/o contrast, MRA head and neck w/o contrast  - pain control with 300mg TID gabapentin, PRN 650mg PO tylenol, PRN 5mg PO oxycodone, PRN  0.4mg IV dilaudid breakthrough    CV  - hx of HTN  - q8h vital signs, on telemetry  - holding home losartan    Pulm  - Encourage IS  - no increased O2 needs  - maintain saturation >92%    GI  - Full liquid diet as tolerated  - protonix 40mg  - lidia-colace, bisacodyl bowel regimen  - simethicone for gas pain  - zofran for nausea      - strict I/Os  - HLIV  - Replete lytes as indicated    ID  - No antimicrobial indication at this time    Heme  - Trend CBC as needed  - Holding DVT ppx (SQH) in setting of recent fall/head injury    Disposition: RNF    Discussed with Dr. José Pace MD  General Surgery PGY1  [facility] [phone]     Robaxin

## 2024-09-26 ENCOUNTER — PHARMACY VISIT (OUTPATIENT)
Dept: PHARMACY | Facility: CLINIC | Age: 69
End: 2024-09-26
Payer: MEDICARE

## 2024-09-26 ENCOUNTER — APPOINTMENT (OUTPATIENT)
Dept: CARDIOLOGY | Facility: HOSPITAL | Age: 69
End: 2024-09-26
Payer: MEDICARE

## 2024-09-26 VITALS
HEART RATE: 73 BPM | BODY MASS INDEX: 29.57 KG/M2 | OXYGEN SATURATION: 93 % | HEIGHT: 65 IN | WEIGHT: 177.47 LBS | TEMPERATURE: 97.2 F | SYSTOLIC BLOOD PRESSURE: 138 MMHG | RESPIRATION RATE: 18 BRPM | DIASTOLIC BLOOD PRESSURE: 82 MMHG

## 2024-09-26 LAB
ALBUMIN SERPL BCP-MCNC: 3.3 G/DL (ref 3.4–5)
ALP SERPL-CCNC: 53 U/L (ref 33–136)
ALT SERPL W P-5'-P-CCNC: 30 U/L (ref 7–45)
AMPHETAMINES UR QL SCN: ABNORMAL
ANION GAP SERPL CALC-SCNC: 12 MMOL/L (ref 10–20)
AORTIC VALVE PEAK VELOCITY: 1.4 M/S
AST SERPL W P-5'-P-CCNC: 32 U/L (ref 9–39)
AV PEAK GRADIENT: 7.8 MMHG
AVA (PEAK VEL): 3.51 CM2
BARBITURATES UR QL SCN: ABNORMAL
BENZODIAZ UR QL SCN: ABNORMAL
BILIRUB SERPL-MCNC: 0.8 MG/DL (ref 0–1.2)
BUN SERPL-MCNC: 6 MG/DL (ref 6–23)
BZE UR QL SCN: ABNORMAL
CALCIUM SERPL-MCNC: 8.7 MG/DL (ref 8.6–10.6)
CANNABINOIDS UR QL SCN: ABNORMAL
CHLORIDE SERPL-SCNC: 101 MMOL/L (ref 98–107)
CO2 SERPL-SCNC: 28 MMOL/L (ref 21–32)
CREAT SERPL-MCNC: 0.59 MG/DL (ref 0.5–1.05)
EGFRCR SERPLBLD CKD-EPI 2021: >90 ML/MIN/1.73M*2
EJECTION FRACTION APICAL 4 CHAMBER: 64.7
EJECTION FRACTION: 67 %
ERYTHROCYTE [DISTWIDTH] IN BLOOD BY AUTOMATED COUNT: 12.5 % (ref 11.5–14.5)
FENTANYL+NORFENTANYL UR QL SCN: ABNORMAL
GLUCOSE SERPL-MCNC: 105 MG/DL (ref 74–99)
HCT VFR BLD AUTO: 36.2 % (ref 36–46)
HGB BLD-MCNC: 11.8 G/DL (ref 12–16)
LEFT ATRIUM VOLUME AREA LENGTH INDEX BSA: 23.9 ML/M2
LEFT VENTRICLE INTERNAL DIMENSION DIASTOLE: 4.1 CM (ref 3.5–6)
LEFT VENTRICULAR OUTFLOW TRACT DIAMETER: 2.1 CM
MCH RBC QN AUTO: 30.4 PG (ref 26–34)
MCHC RBC AUTO-ENTMCNC: 32.6 G/DL (ref 32–36)
MCV RBC AUTO: 93 FL (ref 80–100)
METHADONE UR QL SCN: ABNORMAL
MITRAL VALVE E/A RATIO: 0.87
NRBC BLD-RTO: 0 /100 WBCS (ref 0–0)
OPIATES UR QL SCN: ABNORMAL
OXYCODONE+OXYMORPHONE UR QL SCN: ABNORMAL
PCP UR QL SCN: ABNORMAL
PLATELET # BLD AUTO: 217 X10*3/UL (ref 150–450)
POTASSIUM SERPL-SCNC: 4.2 MMOL/L (ref 3.5–5.3)
PROT SERPL-MCNC: 5.3 G/DL (ref 6.4–8.2)
RBC # BLD AUTO: 3.88 X10*6/UL (ref 4–5.2)
RIGHT VENTRICLE FREE WALL PEAK S': 9.25 CM/S
RIGHT VENTRICLE PEAK SYSTOLIC PRESSURE: 35.3 MMHG
SODIUM SERPL-SCNC: 137 MMOL/L (ref 136–145)
TRICUSPID ANNULAR PLANE SYSTOLIC EXCURSION: 2.3 CM
WBC # BLD AUTO: 6.6 X10*3/UL (ref 4.4–11.3)

## 2024-09-26 PROCEDURE — 99232 SBSQ HOSP IP/OBS MODERATE 35: CPT

## 2024-09-26 PROCEDURE — 80053 COMPREHEN METABOLIC PANEL: CPT | Performed by: SURGERY

## 2024-09-26 PROCEDURE — 93306 TTE W/DOPPLER COMPLETE: CPT

## 2024-09-26 PROCEDURE — RXMED WILLOW AMBULATORY MEDICATION CHARGE

## 2024-09-26 PROCEDURE — 93306 TTE W/DOPPLER COMPLETE: CPT | Performed by: STUDENT IN AN ORGANIZED HEALTH CARE EDUCATION/TRAINING PROGRAM

## 2024-09-26 PROCEDURE — 85027 COMPLETE CBC AUTOMATED: CPT | Performed by: SURGERY

## 2024-09-26 PROCEDURE — 36415 COLL VENOUS BLD VENIPUNCTURE: CPT | Performed by: SURGERY

## 2024-09-26 PROCEDURE — 99024 POSTOP FOLLOW-UP VISIT: CPT | Performed by: NURSE PRACTITIONER

## 2024-09-26 PROCEDURE — 7100000011 HC EXTENDED STAY RECOVERY HOURLY - NURSING UNIT

## 2024-09-26 PROCEDURE — 2500000001 HC RX 250 WO HCPCS SELF ADMINISTERED DRUGS (ALT 637 FOR MEDICARE OP): Performed by: SURGERY

## 2024-09-26 RX ORDER — LOSARTAN POTASSIUM 50 MG/1
50 TABLET ORAL DAILY
Status: DISCONTINUED | OUTPATIENT
Start: 2024-09-27 | End: 2024-09-26

## 2024-09-26 RX ORDER — LOSARTAN POTASSIUM 50 MG/1
50 TABLET ORAL DAILY
Status: DISCONTINUED | OUTPATIENT
Start: 2024-09-26 | End: 2024-09-26

## 2024-09-26 RX ORDER — PANTOPRAZOLE SODIUM 40 MG/1
40 TABLET, DELAYED RELEASE ORAL
Qty: 30 TABLET | Refills: 0 | Status: SHIPPED | OUTPATIENT
Start: 2024-09-27 | End: 2024-10-27

## 2024-09-26 RX ORDER — OXYCODONE HYDROCHLORIDE 5 MG/1
5 TABLET ORAL EVERY 6 HOURS PRN
Qty: 8 TABLET | Refills: 0 | Status: SHIPPED | OUTPATIENT
Start: 2024-09-26

## 2024-09-26 RX ORDER — LEVOTHYROXINE SODIUM 50 UG/1
25 TABLET ORAL EVERY EVENING
Status: DISCONTINUED | OUTPATIENT
Start: 2024-09-26 | End: 2024-09-26 | Stop reason: HOSPADM

## 2024-09-26 RX ORDER — LEVOTHYROXINE SODIUM 50 UG/1
25 TABLET ORAL EVERY EVENING
Status: DISCONTINUED | OUTPATIENT
Start: 2024-09-26 | End: 2024-09-26

## 2024-09-26 ASSESSMENT — PAIN SCALES - GENERAL
PAINLEVEL_OUTOF10: 4
PAINLEVEL_OUTOF10: 5 - MODERATE PAIN

## 2024-09-26 ASSESSMENT — PAIN - FUNCTIONAL ASSESSMENT: PAIN_FUNCTIONAL_ASSESSMENT: 0-10

## 2024-09-26 ASSESSMENT — PAIN SCALES - WONG BAKER: WONGBAKER_NUMERICALRESPONSE: HURTS LITTLE MORE

## 2024-09-26 NOTE — HOSPITAL COURSE
69 year old female with history of HTN, hypothyroidism, and MALS who presented for elective laparoscopic hiatal hernia repair, MALS release on 9/24/24 with general surgery and vascular surgery. Procedure was completed without complications. Her inpatient course was complicated by an overnight episode of dizziness while standing followed by fall with headstrike and hitting her right knee. She did not have any neurological symptoms after the incident. Right knee x-ray was negative. Head CT demonstrated possible area of cerebellar infarct vs imaging artifact, which prompted inpatient consult to neurology. Further work up was completed with MRI and MRA that demonstrated no signs of acute abnormality and she was cleared for discharge from a neurology standpoint. She remained asymptomatic. Upon discharge, pain was well controlled, tolerating advancement to soft diet, ambulating and voiding without difficulty. She was discharged home with instruction for outpatient surgical follow up in 2 weeks.

## 2024-09-26 NOTE — CARE PLAN
The patient's goals for the shift include resting.     The clinical goals for the shift include pt will remain safe and free from falls throughout shift    Over the shift, the patient did make progress towards the above mentioned goals. The patient remained safe, free from falls and was able to obtain resting periods throughout the shift.

## 2024-09-26 NOTE — PROGRESS NOTES
"Bonnie Robledo is a 69 y.o. female on day 1 of admission presenting with Median arcuate ligament syndrome (CMS-HCC).    Subjective   Patient with no new or worsening symptoms this morning. She expressed she was taken aback by the urine drug screen as she thought it would be for bacteria but nobody told her they were doing a drug screen on her. Pt also inquired about the echo and the previous npo status. Surgery resident walked into the room and together explained that the drug screen and echo and npo are protocol for stroke. Aside from this now new concerns. Expressed she would like to be discharged today if possible.     Objective     Last Recorded Vitals  Blood pressure 145/81, pulse 80, temperature 36.6 °C (97.9 °F), temperature source Temporal, resp. rate 18, height 1.651 m (5' 5\"), weight 80.5 kg (177 lb 7.5 oz), SpO2 93%.    Physical Exam  Constitutional:       General: She is awake. She is not in acute distress.     Appearance: Normal appearance.   HENT:      Head: Normocephalic and atraumatic.   Cardiovascular:      Rate and Rhythm: Normal rate and regular rhythm.      Heart sounds: Normal heart sounds.   Pulmonary:      Effort: Pulmonary effort is normal.      Breath sounds: Normal breath sounds.   Abdominal:      Tenderness: There is abdominal tenderness.   Skin:     General: Skin is warm and dry.   Neurological:      General: No focal deficit present.      Mental Status: Mental status is at baseline.      Motor: No weakness.      Coordination: Coordination normal.      Gait: Gait normal.   Psychiatric:         Speech: Speech normal.       Neurological Exam  Mental Status  Awake. Oriented to person, place, time and situation. Speech is normal. Able to name objects and repeat. Attention and concentration are normal. Fund of knowledge is appropriate for level of education. Apraxia absent.    Gait   Normal gait.     CRANIAL NERVES:  Cranial nerves were normal.       CN 2- visual fields full to confrontation. "   CN 3, 4, 6-  Pupils round, 4 mm in diameter, equally reactive to light. No ptosis. EOMs normal alignment, full range of movement, no nystagmus      CN 5- Facial sensation intact bilaterally. Corneal reflex not assessed.       CN 7- Normal and symmetric facial strength. Nasolabial folds symmetric.      CN 8- Hearing intact to finger rub unilaterally and bilaterally.       CN 9- Palate elevates symmetrically. Gag reflex not assessed.       CN 11- Normal strength of shoulder shrug and neck turning       CN 12- Tongue midline, with normal bulk and strength; no fasciculations.      MOTOR:  Motor exam was normal.   Muscle bulk: normal bulk. No atrophy appreciated.  Muscle tone: MAS score 0. No hypertonia or hypotonia,   Muscle strength Right/Left: Bicep 5/5, Tricep 5/5, Deltoid 5/5, Quadricept 5/5, Hamstring 5/5  Fasciculations, tremor or other abnormal movements: Absent.    REFLEXES: 3+ throughout.  Right/ Left:  Biceps 3/3, brachioradialis 3/3, triceps 3/3, patellar 3/3, ankle 3/3.  Babinski: toes downgoing to plantar stimulation.   Clonus, frontal release signs or other pathologic reflexes: Not present.     SENSORY: Sensory exam was intact.  Light touch: Intact to touch while performing neuro exam  Sharp/dull: Intact to sharp  Vibration and Proprioception: Intact to position of toe.     COORDINATION:    Finger-nose-finger: Smooth, rhythmical, and precise.  Heel-to-shin: Smooth, rhythmical, and precise.  Lurias three step: not performed.     GAIT: Station was stable with a normal base.  Romberg sign: Negative.  Gait: was stable with a normal arm swing and speed.   Tandem gait: intact.  Ataxia, shuffling, steppage or waddling:  Absent.      NIHSS: 0       Scheduled medications  gabapentin, 300 mg, oral, TID  pantoprazole, 40 mg, oral, Daily before breakfast   Or  pantoprazole, 40 mg, intravenous, Daily before breakfast  sennosides-docusate sodium, 1 tablet, oral, BID      Continuous medications     PRN medications  PRN  medications: acetaminophen, bisacodyl, HYDROmorphone, naloxone, ondansetron **OR** ondansetron, oxyCODONE, simethicone    Results for orders placed or performed during the hospital encounter of 09/24/24 (from the past 24 hour(s))   Troponin I, High Sensitivity   Result Value Ref Range    Troponin I, High Sensitivity (CMC) 14 0 - 34 ng/L   Lipid panel   Result Value Ref Range    Cholesterol 181 0 - 199 mg/dL    HDL-Cholesterol 71.4 mg/dL    Cholesterol/HDL Ratio 2.5     LDL Calculated 93 <=99 mg/dL    VLDL 17 0 - 40 mg/dL    Triglycerides 83 0 - 149 mg/dL    Non HDL Cholesterol 110 0 - 149 mg/dL   Hemoglobin A1c   Result Value Ref Range    Hemoglobin A1C 5.5 See comment %    Estimated Average Glucose 111 Not Established mg/dL   Drug Screen, Urine   Result Value Ref Range    Amphetamine Screen, Urine Presumptive Negative Presumptive Negative    Barbiturate Screen, Urine Presumptive Negative Presumptive Negative    Benzodiazepines Screen, Urine Presumptive Positive (A) Presumptive Negative    Cannabinoid Screen, Urine Presumptive Negative Presumptive Negative    Cocaine Metabolite Screen, Urine Presumptive Negative Presumptive Negative    Fentanyl Screen, Urine Presumptive Positive (A) Presumptive Negative    Opiate Screen, Urine Presumptive Positive (A) Presumptive Negative    Oxycodone Screen, Urine Presumptive Positive (A) Presumptive Negative    PCP Screen, Urine Presumptive Negative Presumptive Negative    Methadone Screen, Urine Presumptive Negative Presumptive Negative   CBC   Result Value Ref Range    WBC 6.6 4.4 - 11.3 x10*3/uL    nRBC 0.0 0.0 - 0.0 /100 WBCs    RBC 3.88 (L) 4.00 - 5.20 x10*6/uL    Hemoglobin 11.8 (L) 12.0 - 16.0 g/dL    Hematocrit 36.2 36.0 - 46.0 %    MCV 93 80 - 100 fL    MCH 30.4 26.0 - 34.0 pg    MCHC 32.6 32.0 - 36.0 g/dL    RDW 12.5 11.5 - 14.5 %    Platelets 217 150 - 450 x10*3/uL   Comprehensive metabolic panel   Result Value Ref Range    Glucose 105 (H) 74 - 99 mg/dL    Sodium 137  136 - 145 mmol/L    Potassium 4.2 3.5 - 5.3 mmol/L    Chloride 101 98 - 107 mmol/L    Bicarbonate 28 21 - 32 mmol/L    Anion Gap 12 10 - 20 mmol/L    Urea Nitrogen 6 6 - 23 mg/dL    Creatinine 0.59 0.50 - 1.05 mg/dL    eGFR >90 >60 mL/min/1.73m*2    Calcium 8.7 8.6 - 10.6 mg/dL    Albumin 3.3 (L) 3.4 - 5.0 g/dL    Alkaline Phosphatase 53 33 - 136 U/L    Total Protein 5.3 (L) 6.4 - 8.2 g/dL    AST 32 9 - 39 U/L    Bilirubin, Total 0.8 0.0 - 1.2 mg/dL    ALT 30 7 - 45 U/L     MR brain w and wo IV contrast 9/25/2024     Area of concern on the same-day CT demonstrates no signal abnormality on the current exam, likely representing artifact. No evidence of acute infarct, intracranial mass effect or midline shift. 2. Severe microangiopathic parenchymal changes. 3. Nonspecific partial effusion of the right mastoid air cells.   I personally reviewed the images/study and I agree with the findings as stated by resident physician Dr. Shahid Sterling . This study was interpreted at [facility], [place], [place].   MACRO: None     Dictation workstation:   [id number]        Assessment/Plan    CT Finding of left cerebellar hypoattenuation  Assessment & Plan  Median arcuate ligament syndrome (CMS-HCC)    HTN (hypertension)    Hypothyroidism      Ms Robledo is a 70yo F admitted for median arcuate ligament release surgery, requiring pain management post-op. She had an unwitnessed fall on 9/24 while drowsy from pain medications. CTH was done and incidentally found a hypoattenuation in the L cerebellum. MRI brain showed moderate white matter changes, but no acute findings or abnormalities in cerebellum. MRA H&N with no significant stenosis or LVO. No abnormal findings on neurological exam.    Recommendations:  No further neurologic work-up indicated  Neuro-stroke will sign off     AKIKO FATIMA MS4    Rachel Arcos MD   PGY-3 Neurology  Stroke [phone]

## 2024-09-26 NOTE — CARE PLAN
The patient's goals for the shift include      The clinical goals for the shift include pt will remain safe and free from falls throughout shift. Skin will continue to heal appropriately. Sm abrasion noted of right knee. Abd  incisions continue to improve.     Problem: Skin  Goal: Decreased wound size/increased tissue granulation at next dressing change  Outcome: Progressing     Problem: Skin  Goal: Participates in plan/prevention/treatment measures  Outcome: Progressing     Problem: Skin  Goal: Prevent/minimize sheer/friction injuries  Outcome: Progressing     Problem: Skin  Goal: Promote/optimize nutrition  Outcome: Progressing     Problem: Skin  Goal: Promote skin healing  Outcome: Progressing MARTIN of Abd incisions

## 2024-09-26 NOTE — DISCHARGE SUMMARY
Discharge Diagnosis  Median arcuate ligament syndrome (CMS-HCC)  Hiatal hernia     Issues Requiring Follow-Up  - Outpatient surgical follow up in 2 weeks     Test Results Pending At Discharge  Pending Labs       Order Current Status    [facility] ABO/Rh Group Test Collected (09/25/24 1782)            Hospital Course  69 year old female with history of HTN, hypothyroidism, and MALS who presented for elective laparoscopic hiatal hernia repair, MALS release on 9/24/24 with general surgery and vascular surgery. Procedure was completed without complications. Her inpatient course was complicated by an overnight episode of dizziness while standing followed by fall with headstrike and hitting her right knee. She did not have any neurological symptoms after the incident. Right knee x-ray was negative. Head CT demonstrated possible area of cerebellar infarct vs imaging artifact, which prompted inpatient consult to neurology. Further work up was completed with MRI and MRA that demonstrated no signs of acute abnormality and she was cleared for discharge from a neurology standpoint. She remained asymptomatic. Upon discharge, pain was well controlled, tolerating advancement to soft diet, ambulating and voiding without difficulty. She was discharged home with instruction for outpatient surgical follow up in 2 weeks.     Pertinent Physical Exam At Time of Discharge  Physical Exam  Vitals reviewed.   Constitutional:       General: She is not in acute distress.  Cardiovascular:      Rate and Rhythm: Normal rate and regular rhythm.   Pulmonary:      Effort: Pulmonary effort is normal. No respiratory distress.      Comments: RA  Abdominal:      General: There is no distension.      Palpations: Abdomen is soft.      Tenderness: There is no abdominal tenderness.      Comments: Abdominal lap sites well approximated    Skin:     General: Skin is warm and dry.   Neurological:      Mental Status: She is alert and oriented to person, place, and  time.   Psychiatric:         Mood and Affect: Mood normal.         Behavior: Behavior normal.       Home Medications     Medication List      START taking these medications     oxyCODONE 5 mg immediate release tablet; Commonly known as: Roxicodone;   Take 1 tablet (5 mg) by mouth every 6 hours if needed for severe pain (7 -   10).   pantoprazole 40 mg EC tablet; Commonly known as: ProtoNix; Take 1 tablet   (40 mg) by mouth once daily in the morning. Take before meals. Do not   crush, chew, or split.; Start taking on: September 27, 2024     CONTINUE taking these medications     Linzess 145 mcg capsule; Generic drug: linaCLOtide   losartan 50 mg tablet; Commonly known as: Cozaar   polyethylene glycol 17 gram packet; Commonly known as: Glycolax, Miralax   Synthroid 25 mcg tablet; Generic drug: levothyroxine   VITAMIN B-12 ORAL   Vitamin D3 50 MCG (2000 UT) tablet; Generic drug: cholecalciferol     STOP taking these medications     chlorhexidine 0.12 % solution; Commonly known as: Peridex   chlorhexidine 4 % external liquid; Commonly known as: Hibiclens       Outpatient Follow-Up  Message sent to office for outpatient follow up with Dr Kumar in 2 weeks     CEZAR Anders-CNP

## 2024-09-26 NOTE — CARE PLAN
The patient's goals for the shift include      The clinical goals for the shift include pt will remain safe and free from falls throughout shift      Problem: Fall/Injury  Goal: Not fall by end of shift  Outcome: Progressing     Problem: Fall/Injury  Goal: Be free from injury by end of the shift  Outcome: Progressing     Problem: Fall/Injury  Goal: Verbalize understanding of personal risk factors for fall in the hospital  Outcome: Progressing     Problem: Fall/Injury  Goal: Verbalize understanding of risk factor reduction measures to prevent injury from fall in the home  Outcome: Progressing     Problem: Fall/Injury  Goal: Pace activities to prevent fatigue by end of the shift  Outcome: Progressing

## 2024-09-26 NOTE — NURSING NOTE
Patient was discharged home without homecare. RN removed patient's peripheral IVs prior to discharge. RN went over discharge instruction with patient. Patient did not have any questions/comments regarding discharge instructions. Patient left via hospital transport accompanied by her family member with her belongings.

## 2024-09-26 NOTE — PROGRESS NOTES
VASCULAR SURGERY PROGRESS NOTE  Assessment/Plan   Bonnie Robledo is 69 y.o. female with history of MALS who is POD2 from laparoscopic MALS release, hiatal hernia repair. Overnight the evening of POD1, tripped and fell while getting up to go to the bathroom, struck head, no LOC.   CTH obtained with L cerebellar hypoattenuation c/f stroke vs artifact. Neurology consulted, with stroke workup initiated including MRI brain and MRA head/neck.   Tolerating full liquids without nausea/vomiting, endorses moderate abdominal pain improved with medications.     Plan:  Neurology consulted, continue stroke workup  Follow up read of MRI brain, MRA head/neck  Diet per [facility] surgery  Remainder of care per primary team    D/w attending, Dr. Martine Car MD  PGY-3 General Surgery  Vascular Surgery [id number]         Subjective   No acute events overnight  Went for MRI brain/MRA head/neck yesterday  Continues to deny neurologic symptoms  Tolerating full liquids, denies nausea/vomiting  Endorses moderate abdominal pain, improved with medications  Passing gas    Objective   Vitals:  Heart Rate:  [77-87]   Temp:  [36 °C (96.8 °F)-37.2 °C (99 °F)]   Resp:  [16-18]   BP: (122-149)/(72-83)   Weight:  [80.5 kg (177 lb 7.5 oz)]   SpO2:  [90 %-99 %]     Exam:  Constitutional: No acute distress, resting comfortably  Neuro:  AOx3, grossly intact  ENMT: moist mucous membranes  CV: no tachycardia  Pulm: non-labored on RA  GI: soft, appropriately tender to palpation, nondistended. Lap port sites clean/dry/intact  Skin: warm and dry  Musculoskeletal: moving all extremities    Labs:  Results from last 7 days   Lab Units 09/26/24  0530 09/25/24  0607   WBC AUTO x10*3/uL 6.6 8.5   HEMOGLOBIN g/dL 11.8* 11.9*   PLATELETS AUTO x10*3/uL 217 235      Results from last 7 days   Lab Units 09/26/24  0530 09/25/24  0607   SODIUM mmol/L 137 137   POTASSIUM mmol/L 4.2 4.1   CHLORIDE mmol/L 101 103   CO2 mmol/L 28 26   BUN mg/dL 6 8   CREATININE mg/dL  0.59 0.77   GLUCOSE mg/dL 105* 108*

## 2024-09-26 NOTE — NURSING NOTE
Patient was discharged home without homecare. RN removed patient's peripheral IV prior to discharge. RN went over discharge instruction with patient. Patient did not have any questions/comments regarding discharge instructions. Patient left via hospital transport accompanied by her family member, with her belongings.

## 2024-09-28 LAB
BLOOD EXPIRATION DATE: NORMAL
BLOOD EXPIRATION DATE: NORMAL
DISPENSE STATUS: NORMAL
DISPENSE STATUS: NORMAL
PRODUCT BLOOD TYPE: 6200
PRODUCT BLOOD TYPE: 6200
PRODUCT CODE: NORMAL
PRODUCT CODE: NORMAL
UNIT ABO: NORMAL
UNIT ABO: NORMAL
UNIT NUMBER: NORMAL
UNIT NUMBER: NORMAL
UNIT RH: NORMAL
UNIT RH: NORMAL
UNIT VOLUME: 350
UNIT VOLUME: 350
XM INTEP: NORMAL
XM INTEP: NORMAL

## 2024-10-01 NOTE — PROGRESS NOTES
BARIATRIC SURGERY CLINIC  2 WEEKS FOLLOW UP NOTE    Clinic Date: 10/9/24    Bonnie Robledo, 69 y.o.  MRN: [id number]    Date of Surgery: 9/24/24   Surgical Procedure: Other: MALS    Extra Procedures: None    COMPLICATIONS DURING ADMISSION: None    Initial weight: ***  Pre-surgical weight: ***  Today's Visit:   Wt Readings from Last 1 Encounters:   09/26/24 80.5 kg (177 lb 7.5 oz)    There is no height or weight on file to calculate BMI.   Total weight loss: ***    Surgeon: Nicolle Kumar MD      PROVIDER IMPRESSIONS LAST FUV:          PRESENTING TODAY FOR BARIATRIC POST-OP VISIT:  2 Weeks -  Self Reports Concerns:  ***.  Symptoms:  Abdominal pain: {Yes, No:91011}  Back Pain: {Yes, No:55628}            Bloating / Hiccups: {Yes, No:29628}  Calf/Thigh pain or swelling: {Yes, No:94209}  Constipation: {Yes, No:82872}  Cough / Wheezing: {Yes, No:73234}  Decreased Urine Output: {Yes, No:68822}  Diarrhea: {Yes, No:31088}  Experiencing hunger: {Yes, No:12306}  Fever/Chills: {Yes, No:44303}  Incisions Drainage, Open Areas, Redness, Warmth:  {Yes, No:04081}  Increased Heart Rate:  {Yes, No:64803}  Nausea/vomiting: {Yes, No:63467}  Reflux: {Yes, No:30947}   Are you on medications: {Yes, No:30774}  Shortness of Breath:  {Yes, No:19504}      DIET INTAKE: {LAKdietphase:67710}    Diet History:   Carbonated Beverages: {Yes, No:23704}        Caffeinated Beverages: {Yes, No:86473}  Time to eat meals: ***  Fluid intake: *** oz/day    Protein Intake:  *** grams/day  Breakfast: ***  Lunch: ***  Dinner: ***  Snacks: ***    EXERCISE: ***    GERD - Health Related Quality of Life Questionnaire (GERD- HRQL)  {GERD QOL PPI USE:32415}    How bad is the heartburn? {GERDQOL:03878}  Heartburn when lying down? {GERDQOL:99066}  Heartburn when standing up? {GERDQOL:44739}  Heartburn after meals? {GERDQOL:84338}  Does heartburn change your diet? {GERDQOL:41367}  Does heartburn wake you from sleep? {GERDQOL:91977}    Do you have difficulty swallowing?  {GERDQOL:72937}  Do you have pain with swallowing? {GERDQOL:05426}  If you take medication, does this affect your daily life? {GERDQOL:93358}    How bad is the regurgitation? {GERDQOL:63469}  Regurgitation when lying down? {GERDQOL:76163}  Regurgitation when standing up? {GERDQOL:99958}  Regurgitation after meals? {GERDQOL:38703}  Does regurgitation change your diet? {GERDQOL:05264}  Does regurgitation wake you from sleep? {GERDQOL:21776}    How satisfied are you with your present condition? {satisfaction GERD QOL:66421}    Total score (calculated by summing the individual scores of questions 1-15): ***   Greatest possible score 75 (worst symptoms).   Lowest possible score 0 (no symptoms).    Heartburn score (calculated by summing the individual scores of questions 1-6): ***   Worst heartburn symptoms: 30.   No heartburn symptoms: 0.   Score less than or equal to 12 with each individual question not exceeding 2 indicate heartburn elimination.    Regurgitation score (calculated by summing the individual scores of questions 10-15): ***   Worst regurgitation symptoms: 30.   No regurgitation symptoms: 0.   Score less than or equal to 12 with each individual question not exceeding 2 indicate regurgitation elimination.     Last Visit:    Wt Readings from Last 3 Encounters:   09/26/24 80.5 kg (177 lb 7.5 oz)   09/18/24 78.6 kg (173 lb 4.5 oz)   07/11/24 77.1 kg (170 lb)    *** HELP TEXT ***    This SmartLink requires parameters. Parameters are variables that are added to the SmartLink name to request specific information. The parameter for .lastbmi is the number of encounters to display readings from.    For example: .lastbmi[4    In this example, the SmartLink displays readings from the last four encounters.      HPI: ***    Current Outpatient Medications   Medication Sig Dispense Refill    cholecalciferol (Vitamin D3) 50 MCG (2000 UT) tablet Take 1 tablet (50 mcg) by mouth once daily.      cyanocobalamin, vitamin B-12,  (VITAMIN B-12 ORAL) Take 1 tablet by mouth once daily.      Linzess 145 mcg capsule Take 1 capsule (145 mcg) by mouth once daily in the morning. Take before meals.      losartan (Cozaar) 50 mg tablet Take 1 tablet (50 mg) by mouth once daily.      oxyCODONE (Roxicodone) 5 mg immediate release tablet Take 1 tablet (5 mg) by mouth every 6 hours if needed for severe pain (7 - 10). 8 tablet 0    pantoprazole (ProtoNix) 40 mg EC tablet Take 1 tablet (40 mg) by mouth once daily in the morning. Take before meals. Do not crush, chew, or split. 30 tablet 0    polyethylene glycol (Glycolax, Miralax) 17 gram packet Take 17 g by mouth once daily. In the evening      Synthroid 25 mcg tablet 1 tablet (25 mcg) once daily in the evening.       No current facility-administered medications for this visit.       Comorbidities:  No problem-specific Assessment & Plan notes found for this encounter.      DAILY SUPPLEMENTS:  Calcium: Calcium Citrate w/ vitamin D (1200 - 1500mg)  Multivitamin & Minerals: 2 per day  Iron Supplement: included in multi-vitamin  Vitamin B12: 1000 mcg   Vitamin D3: 3000 units  Other: ***    REVIEW OF SYSTEMS:  CONSTITUTIONAL: Patient denies fevers, chills, sweats and weight changes.  EYES: Patient denies any visual symptoms.  EARS, NOSE, AND THROAT: No difficulties with hearing. No symptoms of rhinitis or sore throat.  CARDIOVASCULAR: Patient denies chest pains, palpitations, orthopnea and paroxysmal nocturnal dyspnea.  RESPIRATORY: No dyspnea on exertion, no wheezing or cough.  GI: No nausea, vomiting, diarrhea, constipation, abdominal pain, hematochezia or melena.  : No urinary hesitancy or dribbling. No nocturia or urinary frequency. No abnormal urethral discharge.  MUSCULOSKELETAL: No myalgias or arthralgias.  NEUROLOGIC: No chronic headaches, no seizures. Patient denies numbness, tingling or weakness.  PSYCHIATRIC: Patient denies problems with mood disturbance. No problems with anxiety.  ENDOCRINE: No  excessive urination or excessive thirst.  DERMATOLOGIC: Patient denies any rashes or skin changes.    PHYSICAL EXAM:  There were no vitals taken for this visit. There is no height or weight on file to calculate BMI.  GENERAL: Obese. No apparent distress. Pt is alert and oriented x3.  HEENT: Head is normocephalic and atraumatic. Extraocular muscles are intact. Pupils are equal, round, and reactive to light and accommodation. Nares appeared normal. Mouth is well hydrated and without lesions. Mucous membranes are moist. Posterior pharynx clear of any exudate or lesions.  NECK: Supple. No carotid bruits. No lymphadenopathy or thyromegaly.  LUNGS: Clear to auscultation.  HEART: Regular rate and rhythm without murmur.  ABDOMEN: Soft, nontender, and nondistended. Positive bowel sounds. No hepatosplenomegaly was noted.  EXTREMITIES: Without any cyanosis, clubbing, rash, lesions or edema.  NEUROLOGIC: Cranial nerves II through XII are grossly intact.  PSYCHIATRIC: Flat affect, but denies suicidal or homicidal ideations.  SKIN: No ulceration or induration present.      TODAY'S PROVIDER VISIT IMPRESSIONS:      A/P: Normal post-OP course      {[facility] up assessment:92478}      Energy: {LAKenergy:60493}      [facility]      Recommendations: {[facility] follow up recommendations:07881}         Activity: Increase activity as tolerated. No heavy lifting > 10 lbs until 4 weeks post-op.      Counseling: Start Vitamins and Omeprazole if you haven't already.       Diet: Advance diet per Handbook/Dietician Instructions, Education on Pureed Diet Phase (spreadable consistency NO CHUNKS) and appropriate diet progression provided.      Return to work: ***       Follow up: {[facility] up:56647}      YOU ARE DOING GREAT, you are 2 weeks post-op Surgical Procedure: {bariatric procedure list:34314}.    Things will get easier over time.     Work your way off the shakes and get all nutrition through food.    You may transition to  pureed foods once your dietician advances your diet. Note: Purees are spreadable consistency NO CHUNKS,  you then can move to soft foods (able to mash with fork).    Continue to aim for 60 grams of protein and 64 oz of water daily.    Increase the duration and frequency of your exercise regimen as you are able.  Your goal is 1 hour/day .    Continue to take vitamin supplements as directed.    Remember to take your multivitamins 2 times a day, once in the morning and once in the evening.     Take you calcium 2-3 times a day, at least two hours apart from the multivitamin.    Come to support groups.    See the dietician as needed.    Follow up in 4 weeks.       Dr. Nicolle Kumar M.D., MPH  Director of Bariatric & Minimally Invasive Surgery  [place] [address]  T:  [phone]  F:  [phone]     Barbi Valencia, RN Assistant Nurse Manager, Care Coordinator Patient Nursing Contact  T: [phone] F: [phone]

## 2024-10-09 ENCOUNTER — OFFICE VISIT (OUTPATIENT)
Dept: SURGERY | Facility: CLINIC | Age: 69
End: 2024-10-09
Payer: MEDICARE

## 2024-10-09 VITALS
HEART RATE: 128 BPM | DIASTOLIC BLOOD PRESSURE: 79 MMHG | WEIGHT: 169 LBS | BODY MASS INDEX: 28.12 KG/M2 | SYSTOLIC BLOOD PRESSURE: 129 MMHG

## 2024-10-09 DIAGNOSIS — R10.11 RIGHT UPPER QUADRANT ABDOMINAL PAIN: ICD-10-CM

## 2024-10-09 DIAGNOSIS — I77.4 MEDIAN ARCUATE LIGAMENT SYNDROME (CMS-HCC): Primary | ICD-10-CM

## 2024-10-09 DIAGNOSIS — K44.9 HIATAL HERNIA: ICD-10-CM

## 2024-10-09 PROCEDURE — 1159F MED LIST DOCD IN RCRD: CPT | Performed by: SURGERY

## 2024-10-09 PROCEDURE — 3078F DIAST BP <80 MM HG: CPT | Performed by: SURGERY

## 2024-10-09 PROCEDURE — 3074F SYST BP LT 130 MM HG: CPT | Performed by: SURGERY

## 2024-10-09 PROCEDURE — 99211 OFF/OP EST MAY X REQ PHY/QHP: CPT | Performed by: SURGERY

## 2024-10-09 PROCEDURE — 1111F DSCHRG MED/CURRENT MED MERGE: CPT | Performed by: SURGERY

## 2024-10-09 NOTE — PATIENT INSTRUCTIONS
PLAN:  You can remove dressings in a week  Keep an eye on the pain  Chew well and eat slowly  Use a heating pad as needed.  Follow up with me as needed.   Dr. Nicolle Kumar M.D., MPH  Director of Bariatric & Minimally Invasive Surgery  [place] [address]  T:  [phone]  F:  [phone]     Barbi Valencia RN Assistant Nurse Manager, Care Coordinator Patient Nursing Contact  T: [phone]  F: [phone]    Raven Fitzpatrick LPN Coordinator  T: [phone] F: [phone]

## 2024-10-09 NOTE — PROGRESS NOTES
GENERAL SURGERY CLINIC  POST-OP FOLLOW UP NOTE  Clinic Date: 10/9/24    Bonnie Robledo, 69 y.o.  MRN: [id number]    Date of Surgery: 9/24/24  Surgical Procedure: MALS release  Extra Procedures: none  COMPLICATIONS DURING ADMISSION: None    Surgeon Attending: Dr. Nicolle Kumar    Last Impression Visit Note 7/31/24: Bonnie Robledo is a 69 y.o. female  with longstanding right abdominal pain that has been extensively worked up.   She has pain in RUQ and no tenderness. Feels all day. The recent work up really started with the episode last year.  She denies food fear.  She has lost 20 lbs unintentionally.    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   She had an open right sort of kocher incision for excision of retroperitoneal schwannoma and due to persistent RUQ pain, a laparoscopic cholecystectomy later that year in 2013. She continued to suffer from RUQ discomfort that improves with linzess and miralax but gets worse with diet and bending.   Most of her workup was done in [place] at [facility] - EGD last year was normal. US abdomen was normal. UGI SFT was normal. 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  She saw a surgeon at [facility] and they did not want to see her. So went to general surgery.  He looked at results and told her the SMA is not the thing.  No further workup done.  The patient and I had an extensive discussion I reviewed her symptoms in detail.  She notes a constant discomfort in the right upper quadrant that seems to be right over her incision.  Her CT was reviewed and it seems like a retroperitoneal approach may have been used to approach her schwannoma that was close to the IVC and not involving the right adrenal.  She notes that the vomiting only happens if she is already having some pain and then something she eats exacerbates it.  She cannot any identify any trigger foods.  She does note a longstanding history of constipation  which is managed with Linzess and MiraLAX.  She notes on this regimen the pain is much less then it would be otherwise.  Her workup was accelerated and the diagnosis of MALS came in the last year when she had a severe episode that awaken her at night.  Where she is tender and has the pain is in the area more of SMA syndrome then of median arcuate ligament syndrome.  She also has never had a gastric emptying study.  I will do an upper endoscopy to further evaluate for SMA syndrome and also get a gastric emptying study.  I have asked the patient to keep a food diary for me.  I am also wondering if she has some adhesions in the right upper quadrant as she has a large stool burden in her CT on that right side.  Depending on the results of the other studies at this time I am actually more inclined to do a diagnostic laparoscopy to look for adhesions to the right upper quadrant and a median arcuate ligament release.  She is scheduled for a celiac node injection later in the week and we will see how she responds to this.  Right now median arcuate ligament syndrome is low on my list as the cause of her symptoms although it is clear radiologically.  The response to the node block will be helpful in this diagnosis.  We will proceed as outlined  Today she comes in for follow-up after having the node block.  She notes that the first block was inconclusive but when she had the second block she was able to tolerate lunch and felt well for several hours.  However by the time he came to dinner her pain started back up.  What is also new is now she has pain on the left side.  She notes that she has been keeping her bowels regular with the MiraLAX and the Linzess and this has been working well for her.  Her gastric emptying study was normal.  Her upper endoscopy was normal.  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  Otherwise she is doing well.  We  discussed that all of this information put together indicates that the median arcuate ligament syndrome is most likely what is going on and causing a lot of her symptoms.  We will plan for median arcuate ligament release.  We confirmed that SMA syndrome is not causing her problems.  We discussed the risks and benefits of the median arcuate release at length.  And she expressed understanding and wishes to proceed.  I will work with Dr. Farley to find a mutually agreeable time.  We also discussed at the time of the procedure we will look up in her right upper quadrant to see if there are any adhesions that are also compounding her symptoms  PLAN:  Keep the constipation controlled with the medications  We will plan for a median arcuate ligament release laparoscopically.  We will look on the right side and look for adhesions.   You will meet with anesthesia prior to the procedure.         Presenting Today For:  Time Since Surgery: 2 weeks since surgery.  FUV.  Verbalizes concerns:  She comes with some notes.  She is not sure if surgery worked.  Still pain on the right. . Still some pain at incisions.  Still using tylenol and ibuprofen.  She is retired pharmacist.  Good joshua is that the hiatal hernia repair stopped her vomiting after eating. No problems eating or drinking.  Dressings are still on.   Self Reports:     Abdominal Pain: Yes    Constipation:  No   Decreased Urine Output:  No    Diarrhea:  No    Bloating / Hiccups:  No    Nausea/Vomiting: No    Back Pain: No    Calf/Thigh pain or swelling: No    Cough / Wheezing: No    Fever/Chills: No    Increased Heart Rate: Yes    Shortness of Breath: No    GERD - Health Related Quality of Life Questionnaire (GERD- HRQL)  On PPI    How bad is the heartburn? 0 = No symptoms  Heartburn when lying down? 0 = No symptoms  Heartburn when standing up? 0 = No symptoms  Heartburn after meals? 0 = No symptoms  Does heartburn change your diet? 0 = No symptoms  Does heartburn wake you  from sleep? 0 = No symptoms    Do you have difficulty swallowing? 0 = No symptoms  Do you have pain with swallowing? 0 = No symptoms  If you take medication, does this affect your daily life? 0 = No symptoms    How bad is the regurgitation? 0 = No symptoms  Regurgitation when lying down? 0 = No symptoms  Regurgitation when standing up? 0 = No symptoms  Regurgitation after meals? 0 = No symptoms  Does regurgitation change your diet? 0 = No symptoms  Does regurgitation wake you from sleep? 0 = No symptoms    How satisfied are you with your present condition? Satisfied    Total score (calculated by summing the individual scores of questions 1-15): 0   Greatest possible score 75 (worst symptoms).   Lowest possible score 0 (no symptoms).    Heartburn score (calculated by summing the individual scores of questions 1-6): 0   Worst heartburn symptoms: 30.   No heartburn symptoms: 0.   Score less than or equal to 12 with each individual question not exceeding 2 indicate heartburn elimination.    Regurgitation score (calculated by summing the individual scores of questions 10-15): 0   Worst regurgitation symptoms: 30.   No regurgitation symptoms: 0.   Score less than or equal to 12 with each individual question not exceeding 2 indicate regurgitation elimination.     Current Diet: regular    Current Medications:   Current Outpatient Medications   Medication Sig Dispense Refill    cholecalciferol (Vitamin D3) 50 MCG (2000 UT) tablet Take 1 tablet (50 mcg) by mouth once daily.      cyanocobalamin, vitamin B-12, (VITAMIN B-12 ORAL) Take 1 tablet by mouth once daily.      Linzess 145 mcg capsule Take 1 capsule (145 mcg) by mouth once daily in the morning. Take before meals.      losartan (Cozaar) 50 mg tablet Take 1 tablet (50 mg) by mouth once daily.      oxyCODONE (Roxicodone) 5 mg immediate release tablet Take 1 tablet (5 mg) by mouth every 6 hours if needed for severe pain (7 - 10). 8 tablet 0    pantoprazole (ProtoNix) 40 mg  EC tablet Take 1 tablet (40 mg) by mouth once daily in the morning. Take before meals. Do not crush, chew, or split. 30 tablet 0    polyethylene glycol (Glycolax, Miralax) 17 gram packet Take 17 g by mouth once daily. In the evening      Synthroid 25 mcg tablet 1 tablet (25 mcg) once daily in the evening.       No current facility-administered medications for this visit.       Visit Vitals  Wt Readings from Last 1 Encounters:   09/26/24 80.5 kg (177 lb 7.5 oz)    There is no height or weight on file to calculate BMI.    REVIEW OF SYSTEMS:  CONSTITUTIONAL: Patient denies fevers, chills, sweats and weight changes.  EYES: Patient denies any visual symptoms.  EARS, NOSE, AND THROAT: No difficulties with hearing. No symptoms of rhinitis or sore throat.  CARDIOVASCULAR: Patient denies chest pains, palpitations, orthopnea and paroxysmal nocturnal dyspnea.  RESPIRATORY: No dyspnea on exertion, no wheezing or cough.  GI: No nausea, vomiting, diarrhea, constipation, abdominal pain, hematochezia or melena.  : No urinary hesitancy or dribbling. No nocturia or urinary frequency. No abnormal urethral discharge.  MUSCULOSKELETAL: No myalgias or arthralgias.  NEUROLOGIC: No chronic headaches, no seizures. Patient denies numbness, tingling or weakness.  PSYCHIATRIC: Patient denies problems with mood disturbance. No problems with anxiety.  ENDOCRINE: No excessive urination or excessive thirst.  DERMATOLOGIC: Patient denies any rashes or skin changes.    PHYSICAL EXAM:  PHYSICAL EXAMINATION:  GENERAL: No apparent distress. Pt is alert and oriented x3.  VITAL SIGNS: HR, BP, Temp; Normal  HEENT: Head is normocephalic and atraumatic. Extraocular muscles are intact. Pupils are equal, round, and reactive to light and accommodation. Nares appeared normal. Mouth is well hydrated and without lesions. Mucous membranes are moist. Posterior pharynx clear of any exudate or lesions.  NECK: Supple. No carotid bruits. No lymphadenopathy or  thyromegaly.  LUNGS: Clear to auscultation.  HEART: Regular rate and rhythm without murmur.  ABDOMEN: Soft, appropriately mildly tender to palpation, and nondistended. Positive bowel sounds. No hepatosplenomegaly was noted. Incisions CDI. Dressings in place.  Some are lifting.   EXTREMITIES: Without any cyanosis, clubbing, rash, lesions or edema.  NEUROLOGIC: Cranial nerves II through XII are grossly intact.  PSYCHIATRIC: Flat affect, but denies suicidal or homicidal ideations.  SKIN: No ulceration or induration present.      IMPRESSION:Bonnie Robledo is a 69 y.o. female  with longstanding right abdominal pain that has been extensively worked up.   She has pain in RUQ and no tenderness. Feels all day. The recent work up really started with the episode last year.  She denies food fear.  She has lost 20 lbs unintentionally.    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   She had an open right sort of kocher incision for excision of retroperitoneal schwannoma and due to persistent RUQ pain, a laparoscopic cholecystectomy later that year in 2013. She continued to suffer from RUQ discomfort that improves with linzess and miralax but gets worse with diet and bending.   Most of her workup was done in [place] at [facility] - EGD last year was normal. US abdomen was normal. UGI SFT was normal. 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  She saw a surgeon at [facility] and they did not want to see her. So went to general surgery.  He looked at results and told her the SMA is not the thing.  No further workup done.  The patient and I had an extensive discussion I reviewed her symptoms in detail.  She notes a constant discomfort in the right upper quadrant that seems to be right over her incision.  Her CT was reviewed and it seems like a retroperitoneal approach may have been used to approach her schwannoma that was close to the IVC and not involving the right  adrenal.  She notes that the vomiting only happens if she is already having some pain and then something she eats exacerbates it.  She cannot any identify any trigger foods.  She does note a longstanding history of constipation which is managed with Linzess and MiraLAX.  She notes on this regimen the pain is much less then it would be otherwise.  Her workup was accelerated and the diagnosis of MALS came in the last year when she had a severe episode that awaken her at night.  Where she is tender and has the pain is in the area more of SMA syndrome then of median arcuate ligament syndrome.  She also has never had a gastric emptying study.  I will do an upper endoscopy to further evaluate for SMA syndrome and also get a gastric emptying study.  I have asked the patient to keep a food diary for me.  I am also wondering if she has some adhesions in the right upper quadrant as she has a large stool burden in her CT on that right side.  Depending on the results of the other studies at this time I am actually more inclined to do a diagnostic laparoscopy to look for adhesions to the right upper quadrant and a median arcuate ligament release.  She is scheduled for a celiac node injection later in the week and we will see how she responds to this.  Right now median arcuate ligament syndrome is low on my list as the cause of her symptoms although it is clear radiologically.  The response to the node block will be helpful in this diagnosis.  We will proceed as outlined  Today she comes in for follow-up after having the node block.  She notes that the first block was inconclusive but when she had the second block she was able to tolerate lunch and felt well for several hours.  However by the time he came to dinner her pain started back up.  What is also new is now she has pain on the left side.  She notes that she has been keeping her bowels regular with the MiraLAX and the Linzess and this has been working well for her.  Her  gastric emptying study was normal.  Her upper endoscopy was normal.  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  Otherwise she is doing well.  We discussed that all of this information put together indicates that the median arcuate ligament syndrome is most likely what is going on and causing a lot of her symptoms.  We will plan for median arcuate ligament release.  We confirmed that SMA syndrome is not causing her problems.  We discussed the risks and benefits of the median arcuate release at length.  And she expressed understanding and wishes to proceed.  I will work with Dr. Farley to find a mutually agreeable time.  We also discussed at the time of the procedure we will look up in her right upper quadrant to see if there are any adhesions that are also compounding her symptoms  Today  She is 2 weeks post mals release, guilherme, and hh repair.   Still with some incisional pain and pain in ruq. Ruq pain is different but told her we removed adhesions there.  No longer vomiting after meals and attributed to HH repair.  Overall a good result.     PLAN:  You can remove dressings in a week  Keep an eye on the pain  Chew well and eat slowly    Dr. Nicolle Kumar M.D., MPH  Director of Bariatric & Minimally Invasive Surgery  [place] [address]  T:  [phone]  F:  [phone]     Barbi Valencia, RN Assistant Nurse Manager, Care Coordinator Patient Nursing Contact  T: [phone]  F: [phone]    Raven Fitzpatrick LPN Coordinator  T: [phone] F: [phone]

## 2024-10-15 ENCOUNTER — TELEMEDICINE (OUTPATIENT)
Dept: VASCULAR SURGERY | Facility: CLINIC | Age: 69
End: 2024-10-15
Payer: MEDICARE

## 2024-10-15 DIAGNOSIS — I77.4 MEDIAN ARCUATE LIGAMENT SYNDROME (CMS-HCC): Primary | ICD-10-CM

## 2024-10-15 PROCEDURE — 1125F AMNT PAIN NOTED PAIN PRSNT: CPT | Performed by: SURGERY

## 2024-10-15 PROCEDURE — 1159F MED LIST DOCD IN RCRD: CPT | Performed by: SURGERY

## 2024-10-15 PROCEDURE — 1111F DSCHRG MED/CURRENT MED MERGE: CPT | Performed by: SURGERY

## 2024-10-15 PROCEDURE — 99212 OFFICE O/P EST SF 10 MIN: CPT | Performed by: SURGERY

## 2024-10-15 PROCEDURE — 1036F TOBACCO NON-USER: CPT | Performed by: SURGERY

## 2024-10-15 ASSESSMENT — LIFESTYLE VARIABLES
AUDIT-C TOTAL SCORE: 3
HOW MANY STANDARD DRINKS CONTAINING ALCOHOL DO YOU HAVE ON A TYPICAL DAY: 1 OR 2
SKIP TO QUESTIONS 9-10: 1
HOW OFTEN DO YOU HAVE SIX OR MORE DRINKS ON ONE OCCASION: NEVER
HOW OFTEN DO YOU HAVE A DRINK CONTAINING ALCOHOL: 2-3 TIMES A WEEK

## 2024-10-15 ASSESSMENT — PATIENT HEALTH QUESTIONNAIRE - PHQ9
2. FEELING DOWN, DEPRESSED OR HOPELESS: NOT AT ALL
SUM OF ALL RESPONSES TO PHQ9 QUESTIONS 1 AND 2: 0
1. LITTLE INTEREST OR PLEASURE IN DOING THINGS: NOT AT ALL

## 2024-10-15 ASSESSMENT — ENCOUNTER SYMPTOMS
DEPRESSION: 0
OCCASIONAL FEELINGS OF UNSTEADINESS: 0
LOSS OF SENSATION IN FEET: 0

## 2024-10-15 ASSESSMENT — PAIN SCALES - GENERAL: PAINLEVEL: 4

## 2024-10-15 NOTE — PROGRESS NOTES
Patient and I spoke by telephone as she was at home in [facility] and I was at the [facility] vascular center in [place].  She has recovered since her operation and she no longer has the nausea and vomiting after eating which was one of her complaint but the other component of her complaint the right upper quadrant right-sided abdominal pain persists and is cyclical.  It becomes and goes and waxes and wanes.  She asked if this might be related to the taking down of scar tissue related to her previous surgery.  I believe it may be related to her previous schwannoma surgery and the celiac plexus may not be responsible for remediating pain from this area.  That being said I feel like we have had a benefit with elimination of the nausea and vomiting with eating.  I like to bring her back in a month with a mesenteric duplex.  I encouraged her to continue with the stretching exercises which we had discussed preoperatively.

## 2024-11-15 ENCOUNTER — HOSPITAL ENCOUNTER (OUTPATIENT)
Dept: VASCULAR MEDICINE | Facility: CLINIC | Age: 69
Discharge: HOME | End: 2024-11-15
Payer: MEDICARE

## 2024-11-15 DIAGNOSIS — I77.4 MEDIAN ARCUATE LIGAMENT SYNDROME (CMS-HCC): ICD-10-CM

## 2024-11-15 PROCEDURE — 93975 VASCULAR STUDY: CPT

## 2024-11-15 PROCEDURE — 93975 VASCULAR STUDY: CPT | Performed by: SURGERY

## 2025-07-21 ENCOUNTER — HOSPITAL ENCOUNTER (OUTPATIENT)
Dept: RADIOLOGY | Facility: CLINIC | Age: 70
Discharge: HOME | End: 2025-07-21
Payer: MEDICARE

## 2025-07-21 DIAGNOSIS — I10 ESSENTIAL (PRIMARY) HYPERTENSION: ICD-10-CM

## 2025-07-21 PROCEDURE — 75571 CT HRT W/O DYE W/CA TEST: CPT

## (undated) DEVICE — CARE KIT, LAPAROSCOPIC, ADVANCED

## (undated) DEVICE — Device

## (undated) DEVICE — PAD, GROUNDING, ELECTROSURGICAL, W/9 FT CABLE, POLYHESIVE II, ADULT, LF

## (undated) DEVICE — SPONGE, HEMOSTATIC, GELATIN, SURGIFOAM, 8 X 12.5 CM X 10 MM

## (undated) DEVICE — DRAPE, SHEET, FAN FOLDED, HALF, 44 X 58 IN, DISPOSABLE, LF, STERILE

## (undated) DEVICE — TUBE SET, PNEUMOCLEAR, SMOKE EVACU, HIGH-FLOW

## (undated) DEVICE — DRESSING, ADHESIVE, ISLAND, TELFA, 2 X 3.75 IN, LF

## (undated) DEVICE — TROCAR, OPTICAL, BLADELESS, 12MM, THREADED, 100MM LENGTH

## (undated) DEVICE — MANIFOLD, 4 PORT NEPTUNE STANDARD

## (undated) DEVICE — DISSECTOR, KITTNER, PEANUT, 5MM

## (undated) DEVICE — DRAIN, PENROSE, 0.25 X 12 IN, LF

## (undated) DEVICE — APPLIER,  LIGACLIP, ENDO ROTATE, ROTATING, 10MM 20M/L, DISP

## (undated) DEVICE — SPONGE GAUZE, XRAY SC+RFID, 4X4 16 PLY, STERILE

## (undated) DEVICE — SYSTEM, SMOKE EVACUATION LAPAROSCOPIC SEECLEAR MAX

## (undated) DEVICE — LIGASURE, L-HOOK 44CM SEALER/DIVIDER LAP, MARYLAND

## (undated) DEVICE — REST, HEAD, BAGEL, 7 IN

## (undated) DEVICE — COVER, TABLE, UHC

## (undated) DEVICE — DISSECTOR, ULTRASONIC, CORDLESS, 39CM, DISP

## (undated) DEVICE — COVER, CART, 45 X 27 X 48 IN, CLEAR

## (undated) DEVICE — ELECTRODE, LAPAROSCOPIC OPT12, LF

## (undated) DEVICE — COVER, TABLE, 44 X 75 IN, DISPOSABLE, LF, STERILE

## (undated) DEVICE — CLIPPER, SURGICAL BLADE ASSEMBLY, GENERAL PURPOSE, SINGLE USE

## (undated) DEVICE — BANDAGE, GAUZE, CONFORMING, KERLIX, 6 PLY, 4.5 IN X 4.1 YD

## (undated) DEVICE — GOWN, SURGICAL, SMARTGOWN, XLARGE, STERILE

## (undated) DEVICE — LIGASURE, L-HOOK 37CM SEALER/DIVIDER LAP, MARYLAND

## (undated) DEVICE — SUTURE, VICRYL, 2-0, 27 IN, UR-6, VIOLET

## (undated) DEVICE — PUMP, STRYKERFLOW 2 & HANDPIECE W/10FT. IRRIGATION TUBING

## (undated) DEVICE — APPLICATOR, CHLORAPREP, W/ORANGE TINT, 26ML

## (undated) DEVICE — SPONGE, LAP, XRAY DECT, 18IN X 18IN, W/LOOP, STERILE

## (undated) DEVICE — ANTIFOG, SOLUTION, FOG-OUT

## (undated) DEVICE — SHEARS, CURVED 5MM, ENDOPATH

## (undated) DEVICE — TROCAR, KII OPTICAL BLADELESS 5MM Z THREAD 100MM LNGTH

## (undated) DEVICE — TUBE, SALEM SUMP, 16 FR X 48IN, ENFIT

## (undated) DEVICE — DRAPE, INSTRUMENT, W/POUCH, STERI DRAPE, 7 X 11 IN, DISPOSABLE, STERILE

## (undated) DEVICE — SUTURE, PDS, 0, 18 IN, LIGATING LOOP, VIOLET

## (undated) DEVICE — CLIP, ENDO APPLIER LIGAMAX 5MM

## (undated) DEVICE — SUTURE, VICRYL, 4-0, 18 IN, PS2, UNDYED

## (undated) DEVICE — ACCESS SYS, KII SHIELDED BLADED, Z-THREAD, 12X100CM

## (undated) DEVICE — CATHETER TRAY, SURESTEP, 16FR, URINE METER W/STATLOCK

## (undated) DEVICE — TUBING, INSUFFLATION, LAPAROSCOPIC, FILTER, 10 FT

## (undated) DEVICE — DRAPE, SHEET, ENDOSCOPY, GENERAL, FENESTRATED, ARMBOARD COVER, 98 X 123.5 IN, DISPOSABLE, LF, STERILE